# Patient Record
Sex: FEMALE | Race: WHITE | Employment: OTHER | URBAN - METROPOLITAN AREA
[De-identification: names, ages, dates, MRNs, and addresses within clinical notes are randomized per-mention and may not be internally consistent; named-entity substitution may affect disease eponyms.]

---

## 2017-04-26 ENCOUNTER — HOSPITAL ENCOUNTER (OUTPATIENT)
Dept: WOMENS IMAGING | Age: 72
Discharge: HOME | End: 2017-04-26
Attending: FAMILY MEDICINE | Admitting: FAMILY MEDICINE

## 2017-04-26 DIAGNOSIS — Z12.31 VISIT FOR SCREENING MAMMOGRAM: ICD-10-CM

## 2017-06-07 ENCOUNTER — HOSPITAL ENCOUNTER (OUTPATIENT)
Dept: OTHER | Age: 72
Discharge: HOME | End: 2017-06-30
Attending: PODIATRIST | Admitting: PODIATRIST

## 2017-06-09 ENCOUNTER — HOSPITAL ENCOUNTER (OUTPATIENT)
Dept: PHYSICAL THERAPY | Age: 72
Discharge: HOME | End: 2017-06-10
Admitting: PODIATRIST

## 2017-06-12 ENCOUNTER — HOSPITAL ENCOUNTER (OUTPATIENT)
Dept: PHYSICAL THERAPY | Age: 72
Discharge: HOME | End: 2017-06-13
Admitting: PODIATRIST

## 2017-06-14 ENCOUNTER — HOSPITAL ENCOUNTER (OUTPATIENT)
Dept: PHYSICAL THERAPY | Age: 72
Discharge: HOME | End: 2017-06-15
Admitting: PODIATRIST

## 2017-06-23 ENCOUNTER — HOSPITAL ENCOUNTER (OUTPATIENT)
Dept: PHYSICAL THERAPY | Age: 72
Discharge: HOME | End: 2017-06-24
Admitting: PODIATRIST

## 2017-06-30 ENCOUNTER — HOSPITAL ENCOUNTER (OUTPATIENT)
Dept: PHYSICAL THERAPY | Age: 72
Discharge: HOME | End: 2017-07-01
Admitting: PODIATRIST

## 2017-07-03 ENCOUNTER — HOSPITAL ENCOUNTER (OUTPATIENT)
Dept: PHYSICAL THERAPY | Age: 72
Discharge: HOME | End: 2017-07-04
Admitting: PODIATRIST

## 2018-05-15 ENCOUNTER — HOSPITAL ENCOUNTER (OUTPATIENT)
Dept: WOMENS IMAGING | Age: 73
Discharge: HOME | End: 2018-05-15
Attending: FAMILY MEDICINE | Admitting: FAMILY MEDICINE

## 2018-05-15 DIAGNOSIS — Z12.31 VISIT FOR SCREENING MAMMOGRAM: ICD-10-CM

## 2018-06-13 ENCOUNTER — OFFICE VISIT (OUTPATIENT)
Dept: ORTHOPEDIC SURGERY | Age: 73
End: 2018-06-13

## 2018-06-13 ENCOUNTER — PRE-EVALUATION (OUTPATIENT)
Dept: ORTHOPEDIC SURGERY | Age: 73
End: 2018-06-13

## 2018-06-13 VITALS
SYSTOLIC BLOOD PRESSURE: 139 MMHG | HEART RATE: 61 BPM | HEIGHT: 58 IN | WEIGHT: 195 LBS | DIASTOLIC BLOOD PRESSURE: 83 MMHG | BODY MASS INDEX: 40.93 KG/M2 | RESPIRATION RATE: 16 BRPM

## 2018-06-13 DIAGNOSIS — M25.531 RIGHT WRIST PAIN: ICD-10-CM

## 2018-06-13 DIAGNOSIS — S52.531A CLOSED COLLES' FRACTURE OF RIGHT RADIUS, INITIAL ENCOUNTER: ICD-10-CM

## 2018-06-13 DIAGNOSIS — M25.571 ACUTE RIGHT ANKLE PAIN: ICD-10-CM

## 2018-06-13 DIAGNOSIS — S82.64XA CLOSED NONDISPLACED FRACTURE OF LATERAL MALLEOLUS OF RIGHT FIBULA, INITIAL ENCOUNTER: ICD-10-CM

## 2018-06-13 DIAGNOSIS — S52.571A OTHER CLOSED INTRA-ARTICULAR FRACTURE OF DISTAL END OF RIGHT RADIUS, INITIAL ENCOUNTER: Primary | ICD-10-CM

## 2018-06-13 PROBLEM — S52.501A CLOSED FRACTURE OF RIGHT DISTAL RADIUS: Status: ACTIVE | Noted: 2018-06-13

## 2018-06-13 PROCEDURE — 99203 OFFICE O/P NEW LOW 30 MIN: CPT | Performed by: ORTHOPAEDIC SURGERY

## 2018-06-13 PROCEDURE — 1090F PRES/ABSN URINE INCON ASSESS: CPT | Performed by: ORTHOPAEDIC SURGERY

## 2018-06-13 PROCEDURE — APPSS30 APP SPLIT SHARED TIME 16-30 MINUTES: Performed by: NURSE PRACTITIONER

## 2018-06-13 PROCEDURE — 1123F ACP DISCUSS/DSCN MKR DOCD: CPT | Performed by: ORTHOPAEDIC SURGERY

## 2018-06-13 PROCEDURE — L3908 WHO COCK-UP NONMOLDE PRE OTS: HCPCS | Performed by: ORTHOPAEDIC SURGERY

## 2018-06-13 PROCEDURE — G8427 DOCREV CUR MEDS BY ELIG CLIN: HCPCS | Performed by: ORTHOPAEDIC SURGERY

## 2018-06-13 PROCEDURE — G8400 PT W/DXA NO RESULTS DOC: HCPCS | Performed by: ORTHOPAEDIC SURGERY

## 2018-06-13 PROCEDURE — G8417 CALC BMI ABV UP PARAM F/U: HCPCS | Performed by: ORTHOPAEDIC SURGERY

## 2018-06-13 PROCEDURE — G8599 NO ASA/ANTIPLAT THER USE RNG: HCPCS | Performed by: ORTHOPAEDIC SURGERY

## 2018-06-13 PROCEDURE — 4040F PNEUMOC VAC/ADMIN/RCVD: CPT | Performed by: ORTHOPAEDIC SURGERY

## 2018-06-13 PROCEDURE — 1036F TOBACCO NON-USER: CPT | Performed by: ORTHOPAEDIC SURGERY

## 2018-06-13 PROCEDURE — 27786 TREATMENT OF ANKLE FRACTURE: CPT | Performed by: ORTHOPAEDIC SURGERY

## 2018-06-13 PROCEDURE — L4361 PNEUMA/VAC WALK BOOT PRE OTS: HCPCS | Performed by: ORTHOPAEDIC SURGERY

## 2018-06-13 PROCEDURE — 3017F COLORECTAL CA SCREEN DOC REV: CPT | Performed by: ORTHOPAEDIC SURGERY

## 2018-06-13 RX ORDER — CLINDAMYCIN HYDROCHLORIDE 150 MG/1
300 CAPSULE ORAL 4 TIMES DAILY
Qty: 40 CAPSULE | Refills: 0 | Status: SHIPPED | OUTPATIENT
Start: 2018-06-13 | End: 2018-06-18

## 2018-06-13 RX ORDER — CEPHALEXIN 500 MG/1
500 CAPSULE ORAL 4 TIMES DAILY
Qty: 20 CAPSULE | Refills: 0 | Status: CANCELLED | OUTPATIENT
Start: 2018-06-13

## 2018-06-14 ENCOUNTER — HOSPITAL ENCOUNTER (OUTPATIENT)
Dept: SURGERY | Age: 73
Discharge: HOME | End: 2018-06-14
Attending: ORTHOPAEDIC SURGERY | Admitting: ORTHOPAEDIC SURGERY

## 2018-06-14 VITALS
HEART RATE: 84 BPM | HEIGHT: 58 IN | WEIGHT: 191 LBS | SYSTOLIC BLOOD PRESSURE: 144 MMHG | TEMPERATURE: 98 F | RESPIRATION RATE: 16 BRPM | BODY MASS INDEX: 40.09 KG/M2 | DIASTOLIC BLOOD PRESSURE: 75 MMHG | OXYGEN SATURATION: 96 %

## 2018-06-14 DIAGNOSIS — T14.8XXA FRACTURE: ICD-10-CM

## 2018-06-14 LAB
A/G RATIO: 1.1 (ref 1.1–2.2)
ALBUMIN SERPL-MCNC: 3.9 G/DL (ref 3.4–5)
ALP BLD-CCNC: 76 U/L (ref 40–129)
ALT SERPL-CCNC: 11 U/L (ref 10–40)
ANION GAP SERPL CALCULATED.3IONS-SCNC: 13 MMOL/L (ref 3–16)
AST SERPL-CCNC: 22 U/L (ref 15–37)
BILIRUB SERPL-MCNC: 0.6 MG/DL (ref 0–1)
BUN BLDV-MCNC: 13 MG/DL (ref 7–20)
CALCIUM SERPL-MCNC: 9 MG/DL (ref 8.3–10.6)
CHLORIDE BLD-SCNC: 97 MMOL/L (ref 99–110)
CO2: 28 MMOL/L (ref 21–32)
CREAT SERPL-MCNC: 0.6 MG/DL (ref 0.6–1.2)
EKG ATRIAL RATE: 63 BPM
EKG DIAGNOSIS: NORMAL
EKG P AXIS: 61 DEGREES
EKG P-R INTERVAL: 196 MS
EKG Q-T INTERVAL: 436 MS
EKG QRS DURATION: 84 MS
EKG QTC CALCULATION (BAZETT): 446 MS
EKG R AXIS: 5 DEGREES
EKG T AXIS: -14 DEGREES
EKG VENTRICULAR RATE: 63 BPM
GFR AFRICAN AMERICAN: >60
GFR NON-AFRICAN AMERICAN: >60
GLOBULIN: 3.4 G/DL
GLUCOSE BLD-MCNC: 100 MG/DL (ref 70–99)
POTASSIUM SERPL-SCNC: 3.8 MMOL/L (ref 3.5–5.1)
SODIUM BLD-SCNC: 138 MMOL/L (ref 136–145)
TOTAL PROTEIN: 7.3 G/DL (ref 6.4–8.2)

## 2018-06-14 PROCEDURE — 25608 OPTX DST RD XART FX/EPI SEP2: CPT | Performed by: ORTHOPAEDIC SURGERY

## 2018-06-14 PROCEDURE — 93010 ELECTROCARDIOGRAM REPORT: CPT | Performed by: INTERNAL MEDICINE

## 2018-06-14 PROCEDURE — 25608 OPTX DST RD XART FX/EPI SEP2: CPT | Performed by: NURSE PRACTITIONER

## 2018-06-14 RX ORDER — MIDAZOLAM HYDROCHLORIDE 1 MG/ML
2 INJECTION INTRAMUSCULAR; INTRAVENOUS ONCE
Status: COMPLETED | OUTPATIENT
Start: 2018-06-14 | End: 2018-06-14

## 2018-06-14 RX ORDER — SODIUM CHLORIDE, SODIUM LACTATE, POTASSIUM CHLORIDE, CALCIUM CHLORIDE 600; 310; 30; 20 MG/100ML; MG/100ML; MG/100ML; MG/100ML
INJECTION, SOLUTION INTRAVENOUS CONTINUOUS
Status: DISCONTINUED | OUTPATIENT
Start: 2018-06-14 | End: 2018-06-15 | Stop reason: HOSPADM

## 2018-06-14 RX ORDER — MEPERIDINE HYDROCHLORIDE 25 MG/ML
12.5 INJECTION INTRAMUSCULAR; INTRAVENOUS; SUBCUTANEOUS EVERY 5 MIN PRN
Status: DISCONTINUED | OUTPATIENT
Start: 2018-06-14 | End: 2018-06-14

## 2018-06-14 RX ORDER — OXYCODONE HYDROCHLORIDE AND ACETAMINOPHEN 5; 325 MG/1; MG/1
1 TABLET ORAL
Status: COMPLETED | OUTPATIENT
Start: 2018-06-14 | End: 2018-06-14

## 2018-06-14 RX ORDER — MELOXICAM 7.5 MG/1
7.5 TABLET ORAL DAILY
COMMUNITY
End: 2020-11-02

## 2018-06-14 RX ORDER — LIDOCAINE HYDROCHLORIDE 10 MG/ML
1 INJECTION, SOLUTION EPIDURAL; INFILTRATION; INTRACAUDAL; PERINEURAL
Status: ACTIVE | OUTPATIENT
Start: 2018-06-14 | End: 2018-06-14

## 2018-06-14 RX ORDER — HYDROMORPHONE HCL 110MG/55ML
0.5 PATIENT CONTROLLED ANALGESIA SYRINGE INTRAVENOUS EVERY 5 MIN PRN
Status: DISCONTINUED | OUTPATIENT
Start: 2018-06-14 | End: 2018-06-15 | Stop reason: HOSPADM

## 2018-06-14 RX ORDER — LABETALOL HYDROCHLORIDE 5 MG/ML
5 INJECTION, SOLUTION INTRAVENOUS EVERY 10 MIN PRN
Status: DISCONTINUED | OUTPATIENT
Start: 2018-06-14 | End: 2018-06-15 | Stop reason: HOSPADM

## 2018-06-14 RX ORDER — ONDANSETRON 2 MG/ML
4 INJECTION INTRAMUSCULAR; INTRAVENOUS
Status: DISPENSED | OUTPATIENT
Start: 2018-06-14 | End: 2018-06-14

## 2018-06-14 RX ORDER — M-VIT,TX,IRON,MINS/CALC/FOLIC 27MG-0.4MG
1 TABLET ORAL DAILY
COMMUNITY

## 2018-06-14 RX ORDER — CEFAZOLIN SODIUM 2 G/100ML
2 INJECTION, SOLUTION INTRAVENOUS
Status: COMPLETED | OUTPATIENT
Start: 2018-06-14 | End: 2018-06-14

## 2018-06-14 RX ORDER — SODIUM CHLORIDE 0.9 % (FLUSH) 0.9 %
10 SYRINGE (ML) INJECTION EVERY 12 HOURS SCHEDULED
Status: DISCONTINUED | OUTPATIENT
Start: 2018-06-14 | End: 2018-06-15 | Stop reason: HOSPADM

## 2018-06-14 RX ORDER — SODIUM CHLORIDE 0.9 % (FLUSH) 0.9 %
10 SYRINGE (ML) INJECTION PRN
Status: DISCONTINUED | OUTPATIENT
Start: 2018-06-14 | End: 2018-06-15 | Stop reason: HOSPADM

## 2018-06-14 RX ORDER — HYDRALAZINE HYDROCHLORIDE 20 MG/ML
5 INJECTION INTRAMUSCULAR; INTRAVENOUS EVERY 10 MIN PRN
Status: DISCONTINUED | OUTPATIENT
Start: 2018-06-14 | End: 2018-06-15 | Stop reason: HOSPADM

## 2018-06-14 RX ADMIN — MIDAZOLAM HYDROCHLORIDE 2 MG: 1 INJECTION INTRAMUSCULAR; INTRAVENOUS at 09:41

## 2018-06-14 RX ADMIN — CEFAZOLIN SODIUM 2 G: 2 INJECTION, SOLUTION INTRAVENOUS at 10:11

## 2018-06-14 RX ADMIN — SODIUM CHLORIDE, SODIUM LACTATE, POTASSIUM CHLORIDE, CALCIUM CHLORIDE: 600; 310; 30; 20 INJECTION, SOLUTION INTRAVENOUS at 09:05

## 2018-06-14 RX ADMIN — Medication 0.5 MG: at 11:07

## 2018-06-14 RX ADMIN — OXYCODONE HYDROCHLORIDE AND ACETAMINOPHEN 1 TABLET: 5; 325 TABLET ORAL at 11:29

## 2018-06-14 ASSESSMENT — PAIN DESCRIPTION - PAIN TYPE: TYPE: SURGICAL PAIN

## 2018-06-14 ASSESSMENT — PAIN SCALES - GENERAL
PAINLEVEL_OUTOF10: 7
PAINLEVEL_OUTOF10: 4
PAINLEVEL_OUTOF10: 10
PAINLEVEL_OUTOF10: 10
PAINLEVEL_OUTOF10: 4
PAINLEVEL_OUTOF10: 5
PAINLEVEL_OUTOF10: 10
PAINLEVEL_OUTOF10: 7
PAINLEVEL_OUTOF10: 8

## 2018-06-14 ASSESSMENT — PAIN DESCRIPTION - FREQUENCY: FREQUENCY: CONTINUOUS

## 2018-06-14 ASSESSMENT — PAIN - FUNCTIONAL ASSESSMENT: PAIN_FUNCTIONAL_ASSESSMENT: 0-10

## 2018-06-14 ASSESSMENT — PAIN DESCRIPTION - LOCATION: LOCATION: WRIST

## 2018-06-14 ASSESSMENT — PAIN DESCRIPTION - DESCRIPTORS: DESCRIPTORS: ACHING;DISCOMFORT

## 2018-06-14 ASSESSMENT — PAIN DESCRIPTION - ORIENTATION: ORIENTATION: RIGHT

## 2018-06-15 ENCOUNTER — TELEPHONE (OUTPATIENT)
Dept: ORTHOPEDIC SURGERY | Age: 73
End: 2018-06-15

## 2018-06-18 ENCOUNTER — TELEPHONE (OUTPATIENT)
Dept: ORTHOPEDIC SURGERY | Age: 73
End: 2018-06-18

## 2018-06-28 ENCOUNTER — OFFICE VISIT (OUTPATIENT)
Dept: ORTHOPEDIC SURGERY | Age: 73
End: 2018-06-28

## 2018-06-28 VITALS — RESPIRATION RATE: 16 BRPM | BODY MASS INDEX: 40.3 KG/M2 | WEIGHT: 192 LBS | HEIGHT: 58 IN

## 2018-06-28 DIAGNOSIS — S52.571A OTHER CLOSED INTRA-ARTICULAR FRACTURE OF DISTAL END OF RIGHT RADIUS, INITIAL ENCOUNTER: Primary | ICD-10-CM

## 2018-06-28 PROCEDURE — APPNB15 APP NON BILLABLE TIME 0-15 MINS: Performed by: NURSE PRACTITIONER

## 2018-06-28 PROCEDURE — 99024 POSTOP FOLLOW-UP VISIT: CPT | Performed by: NURSE PRACTITIONER

## 2018-07-26 ENCOUNTER — TELEPHONE (OUTPATIENT)
Dept: ORTHOPEDIC SURGERY | Age: 73
End: 2018-07-26

## 2018-07-26 NOTE — TELEPHONE ENCOUNTER
Called lm/voicemail to call office back in regards to message below.     *I don't think the patient is in a boot, we are treating for a wrist fracture, need to verify info*

## 2018-07-26 NOTE — TELEPHONE ENCOUNTER
Pt is calling to see if she can drive to the store. She wanted to take the boot off. The store is about 15 min away she has no one else to take her.

## 2018-07-31 ENCOUNTER — TELEPHONE (OUTPATIENT)
Dept: ORTHOPEDIC SURGERY | Age: 73
End: 2018-07-31

## 2018-07-31 NOTE — TELEPHONE ENCOUNTER
Pt had wrist sx 6/14/18 and is wanting to know if she is allowed to get into therapy pool and do limited exercises   pls call pt thanks

## 2018-08-09 ENCOUNTER — OFFICE VISIT (OUTPATIENT)
Dept: ORTHOPEDIC SURGERY | Age: 73
End: 2018-08-09

## 2018-08-09 VITALS
HEART RATE: 57 BPM | HEIGHT: 58 IN | BODY MASS INDEX: 40.3 KG/M2 | RESPIRATION RATE: 16 BRPM | DIASTOLIC BLOOD PRESSURE: 97 MMHG | WEIGHT: 192 LBS | SYSTOLIC BLOOD PRESSURE: 137 MMHG

## 2018-08-09 DIAGNOSIS — S82.64XA CLOSED NONDISPLACED FRACTURE OF LATERAL MALLEOLUS OF RIGHT FIBULA, INITIAL ENCOUNTER: ICD-10-CM

## 2018-08-09 DIAGNOSIS — S52.571A OTHER CLOSED INTRA-ARTICULAR FRACTURE OF DISTAL END OF RIGHT RADIUS, INITIAL ENCOUNTER: Primary | ICD-10-CM

## 2018-08-09 PROCEDURE — 99024 POSTOP FOLLOW-UP VISIT: CPT | Performed by: NURSE PRACTITIONER

## 2018-08-09 PROCEDURE — APPNB30 APP NON BILLABLE TIME 0-30 MINS: Performed by: NURSE PRACTITIONER

## 2018-08-12 NOTE — PROGRESS NOTES
DIAGNOSIS:    1-Right distal radius 2 part intra-articular displaced fracture, status post ORIF. 2-Right ankle pain / lateral malleolus fracture, 6/7/2018.     DATE OF SURGERY:  6/14/2018. HISTORY OF PRESENT ILLNESS:  Ms. Alfred Rose 68 y.o.  female right handed returns today  for 8 weeks postoperative visit and for 8 week follow up of her right lateral malleolus fracture. The patient denies any significant pain in the right wrist or right ankle. Rates pain a 3/10 VAS mild, aching, intermittent and are improving. Aggravating factors movement and walking. Alleviating factors elevation, rest. No numbness or tingling sensation. No fever or Chills. She  is in a brace and a boot right leg. PHYSICAL EXAMINATION:  The incision is completely healed right wrist.  No signs of any erythema or drainage. She  has no pain with the active or passive range of motion of the right wrist, but decrease ROM. She  has intact sensation, distally, and she  is neurovascularly intact. She has no pain with ROM right ankle, mild swelling. She is mildly tender over the fracture site right ankle. She is neurovascularly intact right lower extremity. IMAGING:  Three views right wrist taken today in the office showed anatomic alignment of right distal radius, plate and screws in good position, no loosening. Three views of the right ankle taken today in the office were reviewed and showed the distal fibula fracture, healing well. IMPRESSION:  8 weeks out from   1-Right distal radius 2 part intra-articular displaced fracture ORIF   2-Right lateral malleolus fracture    PLAN:  For the wrist: I have told the patient to work on ROM, as well as strengthening exercises. She can discontinue the removable forearm brace. No heavy impact activities. The patient will come back for a follow up in 6 weeks. At that time, we will take 3 views of the right wrist. PT if needed then.     For the ankle: She will be WBAT in the boot, and start aggressive ROM and peroneal strengthening exercise. Off the boot in 1 weeks. No heavy impact activities. The patient will come back for a follow up in 6 weeks. At that time, we will take 3 views of the right ankle standing. As this patient has demonstrated risk factors for osteoporosis, such as age greater than [de-identified] years and evidence of a fracture, I have referred the patient back to the primary care physician for evaluation for osteoporosis, including consideration for DEXA scanning, if this is felt to be clinically indicated. The patient is advised to contact the primary care physician to follow-up for further evaluation.      Vandana Fink, APRN - CNP

## 2018-09-20 ENCOUNTER — OFFICE VISIT (OUTPATIENT)
Dept: ORTHOPEDIC SURGERY | Age: 73
End: 2018-09-20

## 2018-09-20 VITALS — RESPIRATION RATE: 16 BRPM | HEIGHT: 58 IN | BODY MASS INDEX: 40.3 KG/M2 | WEIGHT: 192 LBS

## 2018-09-20 DIAGNOSIS — M25.571 ACUTE RIGHT ANKLE PAIN: ICD-10-CM

## 2018-09-20 DIAGNOSIS — S52.571A OTHER CLOSED INTRA-ARTICULAR FRACTURE OF DISTAL END OF RIGHT RADIUS, INITIAL ENCOUNTER: Primary | ICD-10-CM

## 2018-09-20 PROCEDURE — 1123F ACP DISCUSS/DSCN MKR DOCD: CPT | Performed by: ORTHOPAEDIC SURGERY

## 2018-09-20 PROCEDURE — 3017F COLORECTAL CA SCREEN DOC REV: CPT | Performed by: ORTHOPAEDIC SURGERY

## 2018-09-20 PROCEDURE — 99213 OFFICE O/P EST LOW 20 MIN: CPT | Performed by: ORTHOPAEDIC SURGERY

## 2018-09-20 PROCEDURE — G8417 CALC BMI ABV UP PARAM F/U: HCPCS | Performed by: ORTHOPAEDIC SURGERY

## 2018-09-20 PROCEDURE — 1036F TOBACCO NON-USER: CPT | Performed by: ORTHOPAEDIC SURGERY

## 2018-09-20 PROCEDURE — 1101F PT FALLS ASSESS-DOCD LE1/YR: CPT | Performed by: ORTHOPAEDIC SURGERY

## 2018-09-20 PROCEDURE — G8427 DOCREV CUR MEDS BY ELIG CLIN: HCPCS | Performed by: ORTHOPAEDIC SURGERY

## 2018-09-20 PROCEDURE — G8400 PT W/DXA NO RESULTS DOC: HCPCS | Performed by: ORTHOPAEDIC SURGERY

## 2018-09-20 PROCEDURE — 4040F PNEUMOC VAC/ADMIN/RCVD: CPT | Performed by: ORTHOPAEDIC SURGERY

## 2018-09-20 PROCEDURE — G8599 NO ASA/ANTIPLAT THER USE RNG: HCPCS | Performed by: ORTHOPAEDIC SURGERY

## 2018-09-20 PROCEDURE — 1090F PRES/ABSN URINE INCON ASSESS: CPT | Performed by: ORTHOPAEDIC SURGERY

## 2018-09-20 NOTE — PROGRESS NOTES
DIAGNOSIS:    1-Right distal radius 2 part intra-articular displaced fracture, status post ORIF. 2-Right ankle pain / lateral malleolus fracture, 2018. DATE OF SURGERY:  2018. HISTORY OF PRESENT ILLNESS:  Maxim Hunter 68 y.o.  female right handed returns today  for 3 months postoperative visit and follow up of her right lateral malleolus fracture. The patient denies any significant pain in the right wrist or right ankle. Rates pain a 2/10 VAS mild, aching, intermittent and are improving. Aggravating factors movement and walking. Alleviating factors elevation, rest. No numbness or tingling sensation. No fever or Chills. She  is in a brace and a boot right leg. Past Medical History:   Diagnosis Date    Arthritis     Cancer ([facility] [address]     breast    Cataract     Hypoglycemia     Personal history of kidney stones     Tremor        Past Surgical History:   Procedure Laterality Date    APPENDECTOMY      BREAST SURGERY      lumpectomy right     SECTION      CHOLECYSTECTOMY      GASTRIC BYPASS SURGERY      JOINT REPLACEMENT      left knee    JOINT REPLACEMENT      right knee    KIDNEY STONE SURGERY      percutaneous    KNEE ARTHROPLASTY  [id number]    RIGHT TOTAL KNEE ARTHROPLASTY      LITHOTRIPSY      ROTATOR CUFF REPAIR  ,     bilateral    SHOULDER ARTHROPLASTY Right  15    With rotator cuff repair    TONSILLECTOMY      WRIST FRACTURE SURGERY Right 2018     RIGHT DISTAL RADIUS OPEN REDUCTION INTERNAL FIXATION -[place]       Social History     Social History    Marital status:      Spouse name: N/A    Number of children: N/A    Years of education: N/A     Occupational History    Not on file.      Social History Main Topics    Smoking status: Never Smoker    Smokeless tobacco: Never Used    Alcohol use No    Drug use: No    Sexual activity: Not on file     Other Topics Concern    Not on file     Social History Narrative  No narrative on file       No family history on file. Current Outpatient Prescriptions on File Prior to Visit   Medication Sig Dispense Refill    Multiple Vitamins-Minerals (THERAPEUTIC MULTIVITAMIN-MINERALS) tablet Take 1 tablet by mouth daily      meloxicam (MOBIC) 7.5 MG tablet Take 7.5 mg by mouth daily      oxyCODONE-acetaminophen (PERCOCET) 5-325 MG per tablet Take 1 tablet by mouth 2 times daily. [place] calcium citrate (CALCITRATE) 950 MG tablet Take 2 tablets by mouth 2 times daily      loratadine (CLARITIN) 10 MG tablet Take 10 mg by mouth daily      oxybutynin (DITROPAN) 5 MG tablet Take 5 mg by mouth 2 times daily      gabapentin (NEURONTIN) 300 MG capsule Take 300 mg by mouth every evening      sertraline (ZOLOFT) 50 MG tablet Take 25 mg by mouth daily       pramipexole (MIRAPEX) 0.25 MG tablet Take 0.25 mg by mouth nightly as needed       No current facility-administered medications on file prior to visit. Pertinent items are noted in HPI  Review of systems reviewed from Patient History Form dated on 6/13/2018 and available in the patient's chart under the Media tab. No change noted. PHYSICAL EXAMINATION:  Ms. Sophie Mcghee is a very pleasant 68 y.o.  female who presents today in no acute distress, awake, alert, and oriented. She is well dressed, nourished and  groomed. Patient with normal affect. Height is  4' 10\" (1.473 m), weight is 192 lb (87.1 kg), Body mass index is 40.13 kg/m². Resting respiratory rate is 16. The patient walks with no limp. The incision is completely healed right wrist.  No signs of any erythema or drainage. She  has no pain with the active or passive range of motion of the right wrist, full ROM. She  has intact sensation, distally, and she  is neurovascularly intact. She has no pain with ROM right ankle, mild swelling. She is not tender over the fracture site right ankle. She is neurovascularly intact right lower extremity.  Good strength,

## 2019-06-21 ENCOUNTER — HOSPITAL ENCOUNTER (OUTPATIENT)
Dept: WOMENS IMAGING | Age: 74
Discharge: HOME | End: 2019-06-21
Payer: MEDICARE

## 2019-06-21 DIAGNOSIS — Z12.39 BREAST CANCER SCREENING: ICD-10-CM

## 2019-06-21 PROCEDURE — 77063 BREAST TOMOSYNTHESIS BI: CPT

## 2020-01-25 ENCOUNTER — APPOINTMENT (OUTPATIENT)
Dept: GENERAL RADIOLOGY | Age: 75
End: 2020-01-25
Payer: MEDICARE

## 2020-01-25 ENCOUNTER — HOSPITAL ENCOUNTER (EMERGENCY)
Age: 75
Discharge: HOME | End: 2020-01-25
Attending: EMERGENCY MEDICINE
Payer: MEDICARE

## 2020-01-25 VITALS
WEIGHT: 181.44 LBS | TEMPERATURE: 97.1 F | DIASTOLIC BLOOD PRESSURE: 93 MMHG | RESPIRATION RATE: 16 BRPM | OXYGEN SATURATION: 97 % | HEART RATE: 67 BPM | HEIGHT: 59 IN | SYSTOLIC BLOOD PRESSURE: 187 MMHG | BODY MASS INDEX: 36.58 KG/M2

## 2020-01-25 LAB
ANION GAP SERPL CALCULATED.3IONS-SCNC: 16 MMOL/L (ref 3–16)
BASOPHILS ABSOLUTE: 0.1 K/UL (ref 0–0.2)
BASOPHILS RELATIVE PERCENT: 0.8 %
BUN BLDV-MCNC: 9 MG/DL (ref 7–20)
CALCIUM SERPL-MCNC: 8.9 MG/DL (ref 8.3–10.6)
CHLORIDE BLD-SCNC: 99 MMOL/L (ref 99–110)
CO2: 24 MMOL/L (ref 21–32)
CREAT SERPL-MCNC: 0.5 MG/DL (ref 0.6–1.2)
EOSINOPHILS ABSOLUTE: 0 K/UL (ref 0–0.6)
EOSINOPHILS RELATIVE PERCENT: 0.3 %
GFR AFRICAN AMERICAN: >60
GFR NON-AFRICAN AMERICAN: >60
GLUCOSE BLD-MCNC: 91 MG/DL (ref 70–99)
HCT VFR BLD CALC: 41 % (ref 36–48)
HEMOGLOBIN: 13.1 G/DL (ref 12–16)
INR BLD: 0.98 (ref 0.86–1.14)
LYMPHOCYTES ABSOLUTE: 1.7 K/UL (ref 1–5.1)
LYMPHOCYTES RELATIVE PERCENT: 15.9 %
MCH RBC QN AUTO: 29.4 PG (ref 26–34)
MCHC RBC AUTO-ENTMCNC: 31.9 G/DL (ref 31–36)
MCV RBC AUTO: 92.2 FL (ref 80–100)
MONOCYTES ABSOLUTE: 0.9 K/UL (ref 0–1.3)
MONOCYTES RELATIVE PERCENT: 7.7 %
NEUTROPHILS ABSOLUTE: 8.3 K/UL (ref 1.7–7.7)
NEUTROPHILS RELATIVE PERCENT: 75.3 %
PDW BLD-RTO: 14.8 % (ref 12.4–15.4)
PLATELET # BLD: 290 K/UL (ref 135–450)
PMV BLD AUTO: 7.9 FL (ref 5–10.5)
POTASSIUM REFLEX MAGNESIUM: 3.6 MMOL/L (ref 3.5–5.1)
PROTHROMBIN TIME: 11.4 SEC (ref 10–13.2)
RBC # BLD: 4.45 M/UL (ref 4–5.2)
SODIUM BLD-SCNC: 139 MMOL/L (ref 136–145)
WBC # BLD: 11 K/UL (ref 4–11)

## 2020-01-25 PROCEDURE — 85610 PROTHROMBIN TIME: CPT

## 2020-01-25 PROCEDURE — 80048 BASIC METABOLIC PNL TOTAL CA: CPT

## 2020-01-25 PROCEDURE — 72100 X-RAY EXAM L-S SPINE 2/3 VWS: CPT

## 2020-01-25 PROCEDURE — 72070 X-RAY EXAM THORAC SPINE 2VWS: CPT

## 2020-01-25 PROCEDURE — 73030 X-RAY EXAM OF SHOULDER: CPT

## 2020-01-25 PROCEDURE — 85025 COMPLETE CBC W/AUTO DIFF WBC: CPT

## 2020-01-25 PROCEDURE — 36415 COLL VENOUS BLD VENIPUNCTURE: CPT

## 2020-01-25 PROCEDURE — 93005 ELECTROCARDIOGRAM TRACING: CPT | Performed by: EMERGENCY MEDICINE

## 2020-01-25 PROCEDURE — 99284 EMERGENCY DEPT VISIT MOD MDM: CPT

## 2020-01-25 RX ORDER — METAXALONE 800 MG/1
800 TABLET ORAL 3 TIMES DAILY
Qty: 30 TABLET | Refills: 0 | Status: SHIPPED | OUTPATIENT
Start: 2020-01-25 | End: 2020-02-04

## 2020-01-25 RX ORDER — LIDOCAINE 50 MG/G
1 PATCH TOPICAL DAILY
Qty: 10 PATCH | Refills: 0 | Status: SHIPPED | OUTPATIENT
Start: 2020-01-25 | End: 2020-02-04

## 2020-01-25 RX ORDER — MORPHINE SULFATE 2 MG/ML
2 INJECTION, SOLUTION INTRAMUSCULAR; INTRAVENOUS ONCE
Status: DISCONTINUED | OUTPATIENT
Start: 2020-01-25 | End: 2020-01-25 | Stop reason: HOSPADM

## 2020-01-25 ASSESSMENT — PAIN DESCRIPTION - DESCRIPTORS: DESCRIPTORS: TENDER;SHARP

## 2020-01-25 ASSESSMENT — ENCOUNTER SYMPTOMS
BACK PAIN: 1
SHORTNESS OF BREATH: 0
ABDOMINAL PAIN: 0

## 2020-01-25 ASSESSMENT — PAIN DESCRIPTION - PROGRESSION: CLINICAL_PROGRESSION: GRADUALLY WORSENING

## 2020-01-25 ASSESSMENT — PAIN SCALES - GENERAL
PAINLEVEL_OUTOF10: 9
PAINLEVEL_OUTOF10: 5

## 2020-01-25 ASSESSMENT — PAIN DESCRIPTION - PAIN TYPE: TYPE: ACUTE PAIN

## 2020-01-25 ASSESSMENT — PAIN DESCRIPTION - ORIENTATION: ORIENTATION: LEFT

## 2020-01-25 ASSESSMENT — PAIN - FUNCTIONAL ASSESSMENT: PAIN_FUNCTIONAL_ASSESSMENT: PREVENTS OR INTERFERES WITH MANY ACTIVE NOT PASSIVE ACTIVITIES

## 2020-01-25 ASSESSMENT — PAIN DESCRIPTION - FREQUENCY: FREQUENCY: CONTINUOUS

## 2020-01-25 ASSESSMENT — PAIN DESCRIPTION - LOCATION: LOCATION: ARM;BACK;SHOULDER

## 2020-01-25 NOTE — ED PROVIDER NOTES
[facility]      Pt Name: Antionette Rincon  MRN: [id number]  [place] 1945  Date of evaluation: 1/25/2020  Provider: Cassandra Rodríguez MD    [address] [place] [address]       Chief Complaint   Patient presents with    Fall     patient states slip and fall today outside on the deck @ 11:30am.  c/o worsening back, left arm and left shoulder pain. to ED for eval    Back Pain         HISTORY OF PRESENT ILLNESS   (Location/Symptom, Timing/Onset, Context/Setting, Quality, Duration, Modifying Factors, Severity)  Note limiting factors. Antionette Rincon is a 76 y.o. female who presents to the emergency department with back and left shoulder pain     HPI    This is a pleasant 27-year-old  female who suffered a mechanical fall on her wet deck in her backyard earlier today. She landed on her back and then injured her shoulder attempting to catch her self. She denies any syncope chest pain or fainting. She did not strike her head and she is not on any blood thinners. She complains of pain predominantly left shoulder which describes dull achy worse with movements and relieved by rest with no other aggravating leaving factors. She also complains of lumbar back pain which is dull achy worse with movements relieved by rest nonradiating with no other aggravating leaving factors. She denies any focal numbness weakness or tingling and she is able to ambulate with no difficulty. Nursing Notes were reviewed. REVIEW OF SYSTEMS    (2-9 systems for level 4, 10 or more for level 5)     Review of Systems   Constitutional: Negative for chills and fever. Respiratory: Negative for shortness of breath. Gastrointestinal: Negative for abdominal pain. Musculoskeletal: Positive for back pain. Neurological: Negative for headaches. All other systems reviewed and are negative.       Except as noted above the remainder of the review of systems was reviewed and negative. PAST MEDICAL HISTORY     Past Medical History:   Diagnosis Date    Arthritis     Cancer [facility])     breast    Cataract     Hypoglycemia     Personal history of kidney stones     Tremor          SURGICAL HISTORY       Past Surgical History:   Procedure Laterality Date    APPENDECTOMY      BREAST SURGERY  2004    lumpectomy right     SECTION  1975    CHOLECYSTECTOMY      GASTRIC BYPASS SURGERY  2000    JOINT REPLACEMENT  2008    left knee    JOINT REPLACEMENT  2011    right knee    KIDNEY STONE SURGERY      percutaneous    KNEE ARTHROPLASTY  [id number]    RIGHT TOTAL KNEE ARTHROPLASTY      LITHOTRIPSY      ROTATOR CUFF REPAIR  ,     bilateral    SHOULDER ARTHROPLASTY Right 7 28 15    With rotator cuff repair    TONSILLECTOMY      WRIST FRACTURE SURGERY Right 2018     RIGHT DISTAL RADIUS OPEN REDUCTION INTERNAL FIXATION -[employer]         CURRENT MEDICATIONS       Previous Medications    CALCIUM CITRATE (CALCITRATE) 950 MG TABLET    Take 2 tablets by mouth 2 times daily    GABAPENTIN (NEURONTIN) 300 MG CAPSULE    Take 300 mg by mouth every evening    LORATADINE (CLARITIN) 10 MG TABLET    Take 10 mg by mouth daily    MELOXICAM (MOBIC) 7.5 MG TABLET    Take 7.5 mg by mouth daily    MULTIPLE VITAMINS-MINERALS (THERAPEUTIC MULTIVITAMIN-MINERALS) TABLET    Take 1 tablet by mouth daily    OXYBUTYNIN (DITROPAN) 5 MG TABLET    Take 5 mg by mouth 2 times daily    OXYCODONE-ACETAMINOPHEN (PERCOCET) 5-325 MG PER TABLET    Take 1 tablet by mouth 2 times daily. [place] PRAMIPEXOLE (MIRAPEX) 0.25 MG TABLET    Take 0.25 mg by mouth nightly as needed    SERTRALINE (ZOLOFT) 50 MG TABLET    Take 25 mg by mouth daily        ALLERGIES     Atenolol; Augmentin [amoxicillin-pot clavulanate]; and Demerol    FAMILY HISTORY     No family history on file.        SOCIAL HISTORY       Social History     Socioeconomic History    Marital status:      Spouse name: Not on file    Number of abnormality of the thoracic spine. Osteopenia with   multilevel thoracic spondylosis. New superior endplate L1 fracture since chest x-ray 12/23/2017. If further   evaluation for the acuity of the injury is indicated, consider MRI. XR SHOULDER LEFT (MIN 2 VIEWS)   Final Result   No acute abnormality identified. No fracture or dislocation. Severe   osteoporosis. ED BEDSIDE ULTRASOUND:   Performed by ED Physician - none    LABS:  Labs Reviewed   CBC WITH AUTO DIFFERENTIAL - Abnormal; Notable for the following components:       Result Value    Neutrophils Absolute 8.3 (*)     All other components within normal limits    Narrative:     Performed at:  [place] [address]   Phone [phone]   BASIC METABOLIC PANEL W/ REFLEX TO MG FOR LOW K - Abnormal; Notable for the following components:    CREATININE 0.5 (*)     All other components within normal limits    Narrative:     Performed at:  [place] [address]   Phone [phone]   PROTIME-INR    Narrative:     Performed at:  [facility]  [address] [place] [address]   Phone [phone]   URINALYSIS       All other labs were within normal range or not returned as of this dictation. EMERGENCY DEPARTMENT COURSE and DIFFERENTIAL DIAGNOSIS/MDM:   Vitals:    Vitals:    01/25/20 1604   BP: (!) 187/93   Pulse: 67   Resp: 16   Temp: 97.1 °F (36.2 °C)   TempSrc: Oral   SpO2: 97%   Weight: 181 lb 7 oz (82.3 kg)   Height: 4' 11\" (1.499 m)       Of history and physical exam were performed. I reviewed the past medical past surgical social and family history. She was given appropriate analgesia emergency department. MDM    I consider the diagnosis of thoracic or lumbar fracture and she has an L1 compression fracture.   Neuro vastly intact lives by herself at home and I am concerned with her able to handle her ADLs. She needs to be admitted for pain control and for spine consultation at this time. I also consulted [facility] and Dr. Angelina Ventura returned my call. After appropriate analgesia she actually is much better at this time. Is ambulating without significant difficulty she has sterile ice at home and her daughter actually lives with her which is originally not clear to me. Not wish to stay and I think she is stable and safe at home at this point in time      REASSESSMENT          CRITICAL CARE TIME     CONSULTS:  [place]:  Unless otherwise noted below, none     Procedures        FINAL IMPRESSION      1. Closed fracture of first lumbar vertebra, unspecified fracture morphology, initial encounter [facility])          DISPOSITION/PLAN   DISPOSITION      Decision to Discharge    PATIENT REFERRED TO:  [address] [place] [id number] [phone]    In 3 days      Jesica Ridley [facility]  [phone]      As needed      DISCHARGE MEDICATIONS:  New Prescriptions    LIDOCAINE (LIDODERM) 5 %    Place 1 patch onto the skin daily for 10 days 12 hours on, 12 hours off. METAXALONE (SKELAXIN) 800 MG TABLET    Take 1 tablet by mouth 3 times daily for 10 days     Controlled Substances Monitoring:     No flowsheet data found.     (Please note that portions of this note were completed with a voice recognition program.  Efforts were made to edit the dictations but occasionally words are mis-transcribed.)    Miri Li MD (electronically signed)  Attending Emergency Physician         Miri Li MD  01/25/20 174       Miri Li MD  01/25/20 1926       Miri Li MD  01/25/20 2578

## 2020-01-25 NOTE — ED TRIAGE NOTES
patient states slip and fall today outside on the deck @ 11:30am.  c/o worsening back, left arm and left shoulder pain.   to ED for eval

## 2020-01-26 LAB
EKG ATRIAL RATE: 55 BPM
EKG DIAGNOSIS: NORMAL
EKG P AXIS: 20 DEGREES
EKG P-R INTERVAL: 174 MS
EKG Q-T INTERVAL: 442 MS
EKG QRS DURATION: 98 MS
EKG QTC CALCULATION (BAZETT): 422 MS
EKG R AXIS: 13 DEGREES
EKG T AXIS: 6 DEGREES
EKG VENTRICULAR RATE: 55 BPM

## 2020-01-26 PROCEDURE — 93010 ELECTROCARDIOGRAM REPORT: CPT | Performed by: INTERNAL MEDICINE

## 2020-01-26 NOTE — H&P
Hospital Medicine History & Physical      PCP: Dania Garcia    Date of Admission: 2020    Date of Service: Pt seen/examined on 2020 and discharged home    Chief Complaint: I fell      History Of Present Illness:    76 y.o. female with past medical history as below who was at home outside walking when she slipped and fell. She did reach out and try and grab the table. She had pain in her shoulder. She was able to get up on her own. Work-up in the emergency department included CT of the spine that demonstrated L1 compression fracture that was minor. There was concerns for inadequate pain control. Past Medical History:        Diagnosis Date    Arthritis     Cancer ([address]     breast    Cataract     Hypoglycemia     Personal history of kidney stones     Tremor        Past Surgical History:        Procedure Laterality Date    APPENDECTOMY      BREAST SURGERY      lumpectomy right     SECTION      CHOLECYSTECTOMY      GASTRIC BYPASS SURGERY      JOINT REPLACEMENT      left knee    JOINT REPLACEMENT      right knee    KIDNEY STONE SURGERY      percutaneous    KNEE ARTHROPLASTY  [id number]    RIGHT TOTAL KNEE ARTHROPLASTY      LITHOTRIPSY      ROTATOR CUFF REPAIR  ,     bilateral    SHOULDER ARTHROPLASTY Right  15    With rotator cuff repair    TONSILLECTOMY      WRIST FRACTURE SURGERY Right 2018     RIGHT DISTAL RADIUS OPEN REDUCTION INTERNAL FIXATION -[facility]       Medications Prior to Admission:   Prior to Admission medications    Medication Sig Start Date End Date Taking? Authorizing Provider   Multiple Vitamins-Minerals (THERAPEUTIC MULTIVITAMIN-MINERALS) tablet Take 1 tablet by mouth daily    Historical Provider, MD   meloxicam (MOBIC) 7.5 MG tablet Take 7.5 mg by mouth daily    Historical Provider, MD   oxyCODONE-acetaminophen (PERCOCET) 5-325 MG per tablet Take 1 tablet by mouth 2 times daily. Conner Alves     Historical Provider, MD   calcium citrate (CALCITRATE) 950 MG tablet Take 2 tablets by mouth 2 times daily    Historical Provider, MD   loratadine (CLARITIN) 10 MG tablet Take 10 mg by mouth daily    Historical Provider, MD   oxybutynin (DITROPAN) 5 MG tablet Take 5 mg by mouth 2 times daily    Historical Provider, MD   gabapentin (NEURONTIN) 300 MG capsule Take 300 mg by mouth every evening    Historical Provider, MD   sertraline (ZOLOFT) 50 MG tablet Take 25 mg by mouth daily     Historical Provider, MD   pramipexole (MIRAPEX) 0.25 MG tablet Take 0.25 mg by mouth nightly as needed    Historical Provider, MD       Allergies:  Atenolol; Augmentin [amoxicillin-pot clavulanate]; and Demerol    Social History:      The patient currently lives her daughter    TOBACCO:   reports that she has never smoked. She has never used smokeless tobacco.  ETOH:   reports no history of alcohol use. DRUGS: denies      Family History:      Positive as follows:    No family history on file. REVIEW OF SYSTEMS:   Pertinent positives as noted in the HPI. All other systems reviewed and negative. PHYSICAL EXAM PERFORMED:    BP (!) 187/93   Pulse 67   Temp 97.1 °F (36.2 °C) (Oral)   Resp 16   Ht 4' 11\" (1.499 m)   Wt 181 lb 7 oz (82.3 kg)   SpO2 97%   BMI 36.65 kg/m²     General appearance:  No apparent distress, appears stated age and cooperative. HEENT:  Normal cephalic, atraumatic without obvious deformity. Pupils equal, round, and reactive to light. Extra ocular muscles intact. Conjunctivae/corneas clear. Neck: Supple, with full range of motion. No jugular venous distention. Trachea midline. Respiratory:  Normal respiratory effort. Clear to auscultation, bilaterally without Rales/Wheezes/Rhonchi. Cardiovascular:  Regular rate and rhythm with normal S1/S2 without murmurs, rubs or gallops. Abdomen: Soft, non-tender, non-distended with normal bowel sounds. Musculoskeletal:  No clubbing, cyanosis or edema bilaterally.   Decreased range of motion at left shoulder. She is tender to palpation. Skin: Skin color, texture, turgor normal.  No rashes or lesions. Neurologic:  Neurovascularly intact without any focal sensory/motor deficits. Cranial nerves: II-XII intact, grossly non-focal.  Psychiatric:  Alert and oriented, thought content appropriate, normal insight  Capillary Refill: Brisk,< 3 seconds   Peripheral Pulses: +2 palpable, equal bilaterally       Labs:     Recent Labs     01/25/20  1830   WBC 11.0   HGB 13.1   HCT 41.0        Recent Labs     01/25/20  1830      K 3.6   CL 99   CO2 24   BUN 9   CREATININE 0.5*   CALCIUM 8.9     No results for input(s): AST, ALT, BILIDIR, BILITOT, ALKPHOS in the last 72 hours. Recent Labs     01/25/20  1830   INR 0.98     No results for input(s): [place] Citrin in the last 72 hours. Urinalysis:      Lab Results   Component Value Date    NITRU Negative 07/09/2015    WBCUA 0-2 07/09/2015    RBCUA 0-2 07/09/2015    BLOODU Negative 07/09/2015    SPECGRAV <=1.005 07/09/2015    GLUCOSEU Negative 07/09/2015       EKG:    I have reviewed the EKG with the following interpretation: Sinus rhythm    Radiology:     XR THORACIC SPINE (2 VIEWS)   Final Result   No acute osseous abnormality of the thoracic spine. Osteopenia with   multilevel thoracic spondylosis. New superior endplate L1 fracture since chest x-ray 12/23/2017. If further   evaluation for the acuity of the injury is indicated, consider MRI. XR LUMBAR SPINE (2-3 VIEWS)   Final Result   No acute osseous abnormality of the thoracic spine. Osteopenia with   multilevel thoracic spondylosis. New superior endplate L1 fracture since chest x-ray 12/23/2017. If further   evaluation for the acuity of the injury is indicated, consider MRI. XR SHOULDER LEFT (MIN 2 VIEWS)   Final Result   No acute abnormality identified. No fracture or dislocation. Severe   osteoporosis.              ASSESSMENT/PLAN:  Compression fracture of L1  Patient

## 2020-08-03 ENCOUNTER — HOSPITAL ENCOUNTER (OUTPATIENT)
Dept: WOMENS IMAGING | Age: 75
Discharge: HOME | End: 2020-08-03
Payer: MEDICARE

## 2020-08-03 PROCEDURE — 77067 SCR MAMMO BI INCL CAD: CPT

## 2020-11-02 RX ORDER — LANOLIN ALCOHOL/MO/W.PET/CERES
1000 CREAM (GRAM) TOPICAL DAILY
COMMUNITY
Start: 2018-08-28

## 2020-11-02 NOTE — PROGRESS NOTES
[address] time____11/9/2020 1000________        Surgery time____1100________    Take the following medications with a sip of water:    Do not eat or drink anything after 12:00 midnight prior to your surgery. This includes water chewing gum, mints and ice chips. You may brush your teeth and gargle the morning of your surgery, but do not swallow the water     Please see your family doctor/pediatrician for a history and physical and/or concerning medications. Bring any test results/reports from your physicians office. If you are under the care of a heart doctor or specialist doctor, please be aware that you may be asked to them for clearance    You may be asked to stop blood thinners such as Coumadin, Plavix, Fragmin, Lovenox, etc., or any anti-inflammatories such as:  Aspirin, Ibuprofen, Advil, Naproxen prior to your surgery. We also ask that you stop any OTC medications such as fish oil, vitamin E, glucosamine, garlic, Multivitamins, COQ 10, etc.    We ask that you do not smoke 24 hours prior to surgery  We ask that you do not  drink any alcoholic beverages 24 hours prior to surgery     You must make arrangements for a responsible adult to take you home after your surgery. For your safety you will not be allowed to leave alone or drive yourself home. Your surgery will be cancelled if you do not have a ride home. Also for your safety, it is strongly suggested that someone stay with you the first 24 hours after your surgery. A parent or legal guardian must accompany a child scheduled for surgery and plan to stay at the hospital until the child is discharged. Please do not bring other children with you. For your comfort, please wear simple loose fitting clothing to the hospital.  Please do not bring valuables.     Do not wear any make-up or nail polish on your fingers or toes      For your safety, please do not wear any jewelry or body piercing's on the day of surgery. All jewelry must be removed. If you have dentures, they will be removed before going to operating room. For your convenience, we will provide you with a container. If you wear contact lenses or glasses, they will be removed, please bring a case for them. If you have a living will and a durable power of  for healthcare, please bring in a copy. As part of our patient safety program to minimize surgical site infections, we ask you to do the following:    · Please notify your surgeon if you develop any illness between         now and the  day of your surgery. · This includes a cough, cold, fever, sore throat, nausea,         or vomiting, and diarrhea, etc.  ·  Please notify your surgeon if you experience dizziness, shortness         of breath or blurred vision between now and the time of your surgery. Do not shave your operative site 96 hours prior to surgery. For face and neck surgery, men may use an electric razor 48 hours   prior to surgery. You may shower the night before surgery or the morning of   your surgery with an antibacterial soap. You will need to bring a photo ID and insurance card    [facility] has an onsite pharmacy, would you like to utilize our pharmacy     If you will be staying overnight and use a C-pap machine, please bring   your C-pap to hospital     Our goal is to provide you with excellent care, therefore, visitors will be limited to two(2) in the room at a time so that we may focus on providing this care for you. Please contact pre-admission testing if you have any further questions. [facility] phone number:  [phone] [facility] fax number:  [phone]  Please note these are generalized instructions for all surgical cases, you may be provided with more specific instructions according to your surgery.

## 2020-11-06 ENCOUNTER — ANESTHESIA EVENT (OUTPATIENT)
Dept: ENDOSCOPY | Age: 75
End: 2020-11-06
Payer: MEDICARE

## 2020-11-09 ENCOUNTER — HOSPITAL ENCOUNTER (OUTPATIENT)
Age: 75
Setting detail: OUTPATIENT SURGERY
Discharge: HOME | End: 2020-11-09
Attending: INTERNAL MEDICINE | Admitting: INTERNAL MEDICINE
Payer: MEDICARE

## 2020-11-09 ENCOUNTER — ANESTHESIA (OUTPATIENT)
Dept: ENDOSCOPY | Age: 75
End: 2020-11-09
Payer: MEDICARE

## 2020-11-09 VITALS
HEART RATE: 57 BPM | TEMPERATURE: 97.1 F | RESPIRATION RATE: 18 BRPM | HEIGHT: 59 IN | BODY MASS INDEX: 33.09 KG/M2 | WEIGHT: 164.13 LBS | OXYGEN SATURATION: 99 % | DIASTOLIC BLOOD PRESSURE: 82 MMHG | SYSTOLIC BLOOD PRESSURE: 151 MMHG

## 2020-11-09 VITALS
SYSTOLIC BLOOD PRESSURE: 153 MMHG | RESPIRATION RATE: 18 BRPM | DIASTOLIC BLOOD PRESSURE: 68 MMHG | OXYGEN SATURATION: 100 %

## 2020-11-09 PROCEDURE — 7100000011 HC PHASE II RECOVERY - ADDTL 15 MIN: Performed by: INTERNAL MEDICINE

## 2020-11-09 PROCEDURE — 2580000003 HC RX 258: Performed by: ANESTHESIOLOGY

## 2020-11-09 PROCEDURE — 2500000003 HC RX 250 WO HCPCS: Performed by: NURSE ANESTHETIST, CERTIFIED REGISTERED

## 2020-11-09 PROCEDURE — 6360000002 HC RX W HCPCS: Performed by: NURSE ANESTHETIST, CERTIFIED REGISTERED

## 2020-11-09 PROCEDURE — 3609027000 HC COLONOSCOPY: Performed by: INTERNAL MEDICINE

## 2020-11-09 PROCEDURE — 3700000001 HC ADD 15 MINUTES (ANESTHESIA): Performed by: INTERNAL MEDICINE

## 2020-11-09 PROCEDURE — 7100000010 HC PHASE II RECOVERY - FIRST 15 MIN: Performed by: INTERNAL MEDICINE

## 2020-11-09 PROCEDURE — 3700000000 HC ANESTHESIA ATTENDED CARE: Performed by: INTERNAL MEDICINE

## 2020-11-09 RX ORDER — LIDOCAINE HYDROCHLORIDE 20 MG/ML
INJECTION, SOLUTION EPIDURAL; INFILTRATION; INTRACAUDAL; PERINEURAL PRN
Status: DISCONTINUED | OUTPATIENT
Start: 2020-11-09 | End: 2020-11-09 | Stop reason: SDUPTHER

## 2020-11-09 RX ORDER — SODIUM CHLORIDE 0.9 % (FLUSH) 0.9 %
10 SYRINGE (ML) INJECTION EVERY 12 HOURS SCHEDULED
Status: DISCONTINUED | OUTPATIENT
Start: 2020-11-09 | End: 2020-11-09 | Stop reason: HOSPADM

## 2020-11-09 RX ORDER — PROPOFOL 10 MG/ML
INJECTION, EMULSION INTRAVENOUS PRN
Status: DISCONTINUED | OUTPATIENT
Start: 2020-11-09 | End: 2020-11-09 | Stop reason: SDUPTHER

## 2020-11-09 RX ORDER — SODIUM CHLORIDE 9 MG/ML
INJECTION, SOLUTION INTRAVENOUS CONTINUOUS
Status: DISCONTINUED | OUTPATIENT
Start: 2020-11-09 | End: 2020-11-09 | Stop reason: HOSPADM

## 2020-11-09 RX ORDER — SODIUM CHLORIDE 0.9 % (FLUSH) 0.9 %
10 SYRINGE (ML) INJECTION PRN
Status: DISCONTINUED | OUTPATIENT
Start: 2020-11-09 | End: 2020-11-09 | Stop reason: HOSPADM

## 2020-11-09 RX ADMIN — LIDOCAINE HYDROCHLORIDE 60 MG: 20 INJECTION, SOLUTION EPIDURAL; INFILTRATION; INTRACAUDAL; PERINEURAL at 11:29

## 2020-11-09 RX ADMIN — PROPOFOL 40 MG: 10 INJECTION, EMULSION INTRAVENOUS at 11:44

## 2020-11-09 RX ADMIN — PROPOFOL 60 MG: 10 INJECTION, EMULSION INTRAVENOUS at 11:29

## 2020-11-09 RX ADMIN — PROPOFOL 40 MG: 10 INJECTION, EMULSION INTRAVENOUS at 11:36

## 2020-11-09 RX ADMIN — SODIUM CHLORIDE: 9 INJECTION, SOLUTION INTRAVENOUS at 10:50

## 2020-11-09 ASSESSMENT — LIFESTYLE VARIABLES: SMOKING_STATUS: 0

## 2020-11-09 ASSESSMENT — PAIN - FUNCTIONAL ASSESSMENT: PAIN_FUNCTIONAL_ASSESSMENT: 0-10

## 2020-11-09 ASSESSMENT — PAIN SCALES - GENERAL
PAINLEVEL_OUTOF10: 0

## 2020-11-09 NOTE — H&P
[facility]   Pre-operative History and Physical    Patient: Eva Dudley  : 1945  Acct#: [de-identified]    History Obtained From: electronic medical record    HISTORY OF PRESENT ILLNESS  Procedure:Colonoscopy  Indications:screening  Past Medical History:        Diagnosis Date    Arthritis     Cancer ([address]     breast    Cataract     Hypoglycemia     Personal history of kidney stones     Tremor      Past Surgical History:        Procedure Laterality Date    APPENDECTOMY      BREAST SURGERY  2004    lumpectomy right     SECTION      CHOLECYSTECTOMY      COLONOSCOPY      GASTRIC BYPASS SURGERY  2000    JOINT REPLACEMENT      left knee    JOINT REPLACEMENT      right knee    KIDNEY STONE SURGERY      percutaneous    KNEE ARTHROPLASTY  [id number]    RIGHT TOTAL KNEE ARTHROPLASTY      LITHOTRIPSY      ROTATOR CUFF REPAIR  ,     bilateral    SHOULDER ARTHROPLASTY Right 7 28 15    With rotator cuff repair    TONSILLECTOMY      WRIST FRACTURE SURGERY Right 2018     RIGHT DISTAL RADIUS OPEN REDUCTION INTERNAL FIXATION -[employer]     Medications prior to admission:   Prior to Admission medications    Medication Sig Start Date End Date Taking? Authorizing Provider   vitamin B-12 (CYANOCOBALAMIN) 1000 MCG tablet Take 1,000 mcg by mouth daily 18  Yes Historical Provider, MD   Multiple Vitamins-Minerals (THERAPEUTIC MULTIVITAMIN-MINERALS) tablet Take 1 tablet by mouth daily   Yes Historical Provider, MD   oxyCODONE-acetaminophen (PERCOCET) 5-325 MG per tablet Take 1 tablet by mouth 2 times daily. .   Yes Historical Provider, MD   calcium citrate (CALCITRATE) 950 MG tablet Take 2 tablets by mouth 2 times daily   Yes Historical Provider, MD   oxybutynin (DITROPAN) 5 MG tablet Take 5 mg by mouth 2 times daily   Yes Historical Provider, MD   gabapentin (NEURONTIN) 300 MG capsule Take 300 mg by mouth every evening   Yes Historical Provider, MD   sertraline (ZOLOFT) 50 MG tablet Take 25 mg by mouth daily    Yes Historical Provider, MD     Allergies:   Atenolol; Augmentin [amoxicillin-pot clavulanate]; and Demerol    Social History     Socioeconomic History    Marital status:      Spouse name: Not on file    Number of children: Not on file    Years of education: Not on file    Highest education level: Not on file   Occupational History    Not on file   Social Needs    Financial resource strain: Not on file    Food insecurity     Worry: Not on file     Inability: Not on file    Transportation needs     Medical: Not on file     Non-medical: Not on file   Tobacco Use    Smoking status: Never Smoker    Smokeless tobacco: Never Used   Substance and Sexual Activity    Alcohol use: No    Drug use: No    Sexual activity: Not Currently   Lifestyle    Physical activity     Days per week: Not on file     Minutes per session: Not on file    Stress: Not on file   Relationships    Social connections     Talks on phone: Not on file     Gets together: Not on file     Attends Religion service: Not on file     Active member of club or organization: Not on file     Attends meetings of clubs or organizations: Not on file     Relationship status: Not on file    Intimate partner violence     Fear of current or ex partner: Not on file     Emotionally abused: Not on file     Physically abused: Not on file     Forced sexual activity: Not on file   Other Topics Concern    Not on file   Social History Narrative    Not on file     Family History   Problem Relation Age of Onset    Heart Failure Mother          PHYSICAL EXAM:      BP (!) 195/86   Pulse 72   Temp 98.4 °F (36.9 °C) (Temporal)   Resp 16   Ht 4' 11\" (1.499 m)   Wt 164 lb 2 oz (74.4 kg)   SpO2 96%   BMI 33.15 kg/m²  I        Heart:normal    Lungs: normal    Abdomen: normal      ASA Grade:  See anesthesia note      ASSESSMENT AND PLAN:    1.   Procedure options, risks and benefits reviewed with patient and expresses understanding.

## 2020-11-09 NOTE — ANESTHESIA POSTPROCEDURE EVALUATION
Department of Anesthesiology  Postprocedure Note    Patient: Benjamine Opitz  MRN: [id number]  YOB: 1945  Date of evaluation: 11/9/2020  Time:  12:04 PM     Procedure Summary     Date:  11/09/20 Room / Location:  [address] [place] [address]    Anesthesia Start:  1455 Anesthesia Stop:  3038    Procedure:  COLORECTAL CANCER SCREENING, NOT HIGH RISK (N/A ) Diagnosis:       Screen for colon cancer      (SCREENING)    Surgeon:  Bladimir Norris MD Responsible Provider:  Angelia Vargas MD    Anesthesia Type:  MAC ASA Status:  3          Anesthesia Type: MAC    Roxie Phase I: Roxie Score: 10    Roxie Phase II: Roxie Score: 10    Last vitals: Reviewed and per EMR flowsheets.        Anesthesia Post Evaluation    Patient location during evaluation: PACU  Patient participation: complete - patient participated  Level of consciousness: awake and alert  Pain score: 0  Airway patency: patent  Nausea & Vomiting: no nausea and no vomiting  Complications: no  Cardiovascular status: blood pressure returned to baseline  Respiratory status: acceptable  Hydration status: euvolemic

## 2020-11-09 NOTE — PROCEDURES
[facility]  Endoscopy Note    Patient: Tonya Larson  : 1945  Acct#: [de-identified]    Procedure: Colonoscopy     Date:  2020    Surgeon:  Pierce Yost MD    Referring Physician:  Avelina    Previous Colonoscopy: Yes  Date: 5/3/07  Greater than 3 years? Yes    Preoperative Diagnosis:  screening    Postoperative Diagnosis:  Normal colon    Anesthesia:  See anesthesia note    Indications: This is a 76y.o. year old female who presents today with screening for colon cancer. Procedure: An informed consent was obtained from the patient after explanation of indications, benefits, possible risks and complications of the procedure. The patient was then taken to the endoscopy suite, placed in the left lateral decubitus position, and the above IV anesthesia was administered. A digital rectal examination was performed and revealed negative without mass, lesions or tenderness. The Olympus CFQ-180-AL video colonoscope was placed in the patient's rectum under digital direction and advanced to the cecum. The cecum was identified by characteristic anatomy and ballottment. The ileocecal valve was identified. The preparation was good. The scope was then withdrawn back through the cecum, ascending, transverse, descending and sigmoid colons. Carefull circumferential examination of the mucosa in these areas demonstrated normal colonic mucosa throughout. The scope was then withdrawn into the rectum and retroflexed. The retroflexed view of the anal verge and rectum demonstrates no abnormalities. The scope was straightened, the colon was decompressed and the scope was withdrawn from the patient. The patient tolerated the procedure well and was taken to the PACU in good condition. Estimated Blood Loss:  none    Impression: Normal colon    Recommendations:  Repeat colonoscopy in 10 years.     Pierce Yost MD   [facility]  2020

## 2020-11-09 NOTE — ANESTHESIA PRE PROCEDURE
[facility] Department of Anesthesiology  Pre-Anesthesia Evaluation/Consultation       Name:  Ron Jaquez  : 1945  Age:  76 y.o. MRN:  [id number]  Date: 2020           Surgeon: Surgeon(s):  Chloe Rosa MD    Procedure: Procedure(s):  COLORECTAL CANCER SCREENING, NOT HIGH RISK     Allergies   Allergen Reactions    Atenolol      diarrhea    Augmentin [Amoxicillin-Pot Clavulanate] Diarrhea    Demerol Nausea And Vomiting     Patient Active Problem List   Diagnosis    Personal history of kidney stones    [facility] [address]    Arthritis    Tremor    Hypoglycemia    Obesity    CAD (coronary artery disease)    Osteoarthritis, knee    S/P TKR (total knee replacement)    Rotator cuff injury    Closed fracture of right distal radius     Past Medical History:   Diagnosis Date    Arthritis     Cancer ([facility]     breast    Cataract     Hypoglycemia     Personal history of kidney stones     Tremor      Past Surgical History:   Procedure Laterality Date    APPENDECTOMY      BREAST SURGERY  2004    lumpectomy right     SECTION      CHOLECYSTECTOMY      COLONOSCOPY      GASTRIC BYPASS SURGERY  2000    JOINT REPLACEMENT      left knee    JOINT REPLACEMENT      right knee    KIDNEY STONE SURGERY      percutaneous    KNEE ARTHROPLASTY  [id number]    RIGHT TOTAL KNEE ARTHROPLASTY      LITHOTRIPSY      ROTATOR CUFF REPAIR  ,     bilateral    SHOULDER ARTHROPLASTY Right  15    With rotator cuff repair    TONSILLECTOMY      WRIST FRACTURE SURGERY Right 2018     RIGHT DISTAL RADIUS OPEN REDUCTION INTERNAL FIXATION -[employer]     Social History     Tobacco Use    Smoking status: Never Smoker    Smokeless tobacco: Never Used   Substance Use Topics    Alcohol use: No    Drug use: No     Medications  No current facility-administered medications on file prior to encounter.       Current Outpatient Medications on File Prior to Encounter   Medication Sig Dispense Refill    vitamin B-12 (CYANOCOBALAMIN) 1000 MCG tablet Take 1,000 mcg by mouth daily      Multiple Vitamins-Minerals (THERAPEUTIC MULTIVITAMIN-MINERALS) tablet Take 1 tablet by mouth daily      oxyCODONE-acetaminophen (PERCOCET) 5-325 MG per tablet Take 1 tablet by mouth 2 times daily. Manpreet Lisa calcium citrate (CALCITRATE) 950 MG tablet Take 2 tablets by mouth 2 times daily      oxybutynin (DITROPAN) 5 MG tablet Take 5 mg by mouth 2 times daily      gabapentin (NEURONTIN) 300 MG capsule Take 300 mg by mouth every evening      sertraline (ZOLOFT) 50 MG tablet Take 25 mg by mouth daily        Current Facility-Administered Medications   Medication Dose Route Frequency Provider Last Rate Last Dose    0.9 % sodium chloride infusion   Intravenous Continuous Nara Rajput MD        sodium chloride flush 0.9 % injection 10 mL  10 mL Intravenous 2 times per day Nara Rajput MD        sodium chloride flush 0.9 % injection 10 mL  10 mL Intravenous PRN Nara Rajput MD         Vital Signs (Current)   Vitals:    20 1401 20 1038   Pulse:  72   Resp:  16   Temp:  98.4 °F (36.9 °C)   TempSrc:  Temporal   SpO2:  96%   Weight: 167 lb (75.8 kg) 164 lb 2 oz (74.4 kg)   Height: 4' 11\" (1.499 m) 4' 11\" (1.499 m)                                          BP Readings from Last 3 Encounters:   20 (!) 187/93   18 (!) 137/97   18 (!) 144/75     Vital Signs Statistics (for past 48 hrs)     Temp  Av.4 °F (36.9 °C)  Min: 98.4 °F (36.9 °C)   Min taken time: 20 1038  Max: 98.4 °F (36.9 °C)   Max taken time: 20 1038  Pulse  Av  Min: 67   Min taken time: 20 1038  Max: 72   Max taken time: 20 1038  Resp  Av  Min: 16   Min taken time: 20 1038  Max: 16   Max taken time: 20 1038  SpO2  Av %  Min: 96 %   Min taken time: 20 1038  Max: 96 %   Max taken time: 20 1038  BP Readings from Last 3 Encounters:   20 (!) 187/93   08/09/18 (!) 137/97   06/14/18 (!) 144/75       BMI  Body mass index is 33.15 kg/m². Estimated body mass index is 33.15 kg/m² as calculated from the following:    Height as of this encounter: 4' 11\" (1.499 m). Weight as of this encounter: 164 lb 2 oz (74.4 kg). CBC   Lab Results   Component Value Date    WBC 11.0 01/25/2020    RBC 4.45 01/25/2020    RBC 4.56 12/09/2016    HGB 13.1 01/25/2020    HCT 41.0 01/25/2020    MCV 92.2 01/25/2020    RDW 14.8 01/25/2020     01/25/2020     CMP    Lab Results   Component Value Date     01/25/2020    K 3.6 01/25/2020    CL 99 01/25/2020    CO2 24 01/25/2020    BUN 9 01/25/2020    CREATININE 0.5 01/25/2020    GFRAA >60 01/25/2020    GFRAA >60 09/03/2011    AGRATIO 1.1 06/14/2018    LABGLOM >60 01/25/2020    GLUCOSE 91 01/25/2020    GLUCOSE 78 12/09/2016    PROT 7.3 06/14/2018    PROT 7.7 12/09/2016    CALCIUM 8.9 01/25/2020    BILITOT 0.6 06/14/2018    ALKPHOS 76 06/14/2018    AST 22 06/14/2018    ALT 11 06/14/2018     BMP    Lab Results   Component Value Date     01/25/2020    K 3.6 01/25/2020    CL 99 01/25/2020    CO2 24 01/25/2020    BUN 9 01/25/2020    CREATININE 0.5 01/25/2020    CALCIUM 8.9 01/25/2020    GFRAA >60 01/25/2020    GFRAA >60 09/03/2011    LABGLOM >60 01/25/2020    GLUCOSE 91 01/25/2020    GLUCOSE 78 12/09/2016     POCGlucose  No results for input(s): GLUCOSE in the last 72 hours.    Coags    Lab Results   Component Value Date    PROTIME 11.4 01/25/2020    INR 0.98 01/25/2020    APTT 31.5 96/02/5236     HCG (If Applicable) No results found for: PREGTESTUR, PREGSERUM, HCG, HCGQUANT   ABGs No results found for: PHART, PO2ART, PDI1ZQX, EYR8PJG, BEART, P4GUMPSQ   Type & Screen (If Applicable)  No results found for: LABABO, LABRH                         BMI: Wt Readings from Last 3 Encounters:       NPO Status:   Date of last liquid consumption: 11/09/20       Date of last solid food consumption: 11/07/20      Time of last solid consumption: 1800       Anesthesia Evaluation  Patient summary reviewed no history of anesthetic complications:   Airway: Mallampati: II     Neck ROM: full  Mouth opening: > = 3 FB Dental: normal exam         Pulmonary: breath sounds clear to auscultation      (-) COPD, asthma, sleep apnea and not a current smoker                           Cardiovascular:  Exercise tolerance: good (>4 METS),   (+) CAD:,     (-) hypertension, past MI, CABG/stent and  angina        Rate: normal                    Neuro/Psych:      (-) seizures, TIA and CVA           GI/Hepatic/Renal:   (+) renal disease: kidney stones,      (-) GERD       Endo/Other:    (+) : arthritis: OA., .    (-) diabetes mellitus, hypothyroidism               Abdominal:           Vascular:     - DVT and PE. Anesthesia Plan      MAC     ASA 3       Induction: intravenous. Anesthetic plan and risks discussed with patient. Plan discussed with CRNA. This pre-anesthesia assessment may be used as a history and physical.    DOS STAFF ADDENDUM:    Pt seen and examined, chart reviewed (including anesthesia, drug and allergy history). No interval changes to history and physical examination. Anesthetic plan, risks, benefits, alternatives, and personnel involved discussed with patient. Patient verbalized an understanding and agrees to proceed.       Andrew Loving MD  November 9, 2020  10:40 AM

## 2021-03-11 ENCOUNTER — HOSPITAL ENCOUNTER (OUTPATIENT)
Age: 76
Discharge: HOME | End: 2021-03-11
Payer: MEDICARE

## 2021-03-11 ENCOUNTER — HOSPITAL ENCOUNTER (OUTPATIENT)
Dept: GENERAL RADIOLOGY | Age: 76
Discharge: HOME | End: 2021-03-11
Payer: MEDICARE

## 2021-03-11 DIAGNOSIS — R31.21 ASYMPTOMATIC MICROSCOPIC HEMATURIA: ICD-10-CM

## 2021-03-11 DIAGNOSIS — N20.0 CALCULUS OF KIDNEY: ICD-10-CM

## 2021-03-11 DIAGNOSIS — N39.0 URINARY TRACT INFECTION WITHOUT HEMATURIA, SITE UNSPECIFIED: ICD-10-CM

## 2021-03-11 PROCEDURE — 74018 RADEX ABDOMEN 1 VIEW: CPT

## 2021-08-20 ENCOUNTER — HOSPITAL ENCOUNTER (OUTPATIENT)
Dept: WOMENS IMAGING | Age: 76
Discharge: HOME | End: 2021-08-20
Payer: MEDICARE

## 2021-08-20 DIAGNOSIS — Z12.31 BREAST CANCER SCREENING BY MAMMOGRAM: ICD-10-CM

## 2021-08-20 PROCEDURE — 77063 BREAST TOMOSYNTHESIS BI: CPT

## 2022-02-16 ENCOUNTER — HOSPITAL ENCOUNTER (OUTPATIENT)
Dept: MRI IMAGING | Age: 77
Discharge: HOME | End: 2022-02-16
Payer: MEDICARE

## 2022-02-16 DIAGNOSIS — M48.061 SPINAL STENOSIS OF LUMBAR REGION, UNSPECIFIED WHETHER NEUROGENIC CLAUDICATION PRESENT: ICD-10-CM

## 2022-02-16 PROCEDURE — 72148 MRI LUMBAR SPINE W/O DYE: CPT

## 2022-05-05 ENCOUNTER — HOSPITAL ENCOUNTER (OUTPATIENT)
Dept: GENERAL RADIOLOGY | Age: 77
Discharge: HOME | End: 2022-05-05
Payer: MEDICARE

## 2022-05-05 ENCOUNTER — HOSPITAL ENCOUNTER (OUTPATIENT)
Age: 77
Discharge: HOME | End: 2022-05-05
Payer: MEDICARE

## 2022-05-05 DIAGNOSIS — N20.0 CALCULUS OF KIDNEY: ICD-10-CM

## 2022-05-05 PROCEDURE — 74018 RADEX ABDOMEN 1 VIEW: CPT

## 2022-07-22 ENCOUNTER — HOSPITAL ENCOUNTER (OUTPATIENT)
Dept: PHYSICAL THERAPY | Age: 77
Setting detail: THERAPIES SERIES
Discharge: HOME | End: 2022-07-22
Payer: MEDICARE

## 2022-07-22 PROCEDURE — 97110 THERAPEUTIC EXERCISES: CPT | Performed by: CHIROPRACTOR

## 2022-07-22 PROCEDURE — 97161 PT EVAL LOW COMPLEX 20 MIN: CPT | Performed by: CHIROPRACTOR

## 2022-07-22 NOTE — PLAN OF CARE
[facility], [facility]  [address] [place], [facility]  Phone: [phone]   Fax:     [phone]                                                       Physical Therapy Certification    Dear Referring Provider (secondary): Dr. Mike Cast,    We had the pleasure of evaluating the following patient for physical therapy services at [facility]. A summary of our findings can be found in the initial assessment below. This includes our plan of care. If you have any questions or concerns regarding these findings, please do not hesitate to contact me at the office phone number checked above. Thank you for the referral.       Physician Signature:_______________________________Date:__________________  By signing above (or electronic signature), therapists plan is approved by physician        Patient: Kelly Black   : 1945   MRN: [id number]  Referring Physician: Referring Provider (secondary): Dr. Mike Cast      Evaluation Date: 2022      Medical Diagnosis Information:  Diagnosis: Back pain ( M54.16)   Treatment Diagnosis: Pain in LB radiating down R LE                                         Insurance information: PT Insurance Information: Medicare     Precautions/ Contra-indications:   Latex Allergy:  [x]NO      []YES  Preferred Language for Healthcare:   [x]English       []other:    C-SSRS Triggered by Intake questionnaire (Past 2 wk assessment ):   [] No, Questionnaire did not trigger screening. [x] Yes, Patient intake triggered C-SSRS Screening      [x] C-SSRS Screening completed  [] PCP notified via Epic   C-SSRS Triggered by Intake questionnaire:     YES NO   In the past month, have you wished you were dead or wished you could go to sleep and not wake up? X   In the past month, have you had any thoughts of killing yourself?   X Lifetime   YES NO   Have you ever done anything, started to do anything, or prepared to do anything to end your life? X             Past 3 months    X        SUBJECTIVE: Patient stated complaint: C/o pain in R buttock radiating down R LE                                Pain began following a fall ~ 2 yrs ago    Relevant Medical History:Additional Pertinent Hx: OA,CA, B TKA, Partial shoulder replacement  Functional Scale/Score: FOTO = 56    Pain Scale: Ranges 4-10/10  Easing factors: Rest  Provocative factors:  Working in her garden, excess walking     Type: []Constant   []Intermittent  []Radiating []Localized []other:     Numbness/Tingling: No    Occupation/School: Retired    Living Status/Prior Level of Function: Independent with ADLs     OBJECTIVE:   Palpation: TTP in R upper buttock    Functional Mobility/Transfers: Independent    Posture: good    Gait: (include devices/WB status) Amb well without any assistive device    Bandages/Dressings/Incisions: NA        ROM  Comments   Lumbar Flex WNL    Lumbar Ext Dec 25%      ROM LEFT RIGHT Comments   Lumbar Side Bend WNL Dec 25%    Lumbar Rotation      Quadrant      Hip Flexion      Hip Abd      Hip ER      Hip IR      Hip Extension      Knee Ext      Knee Flex      Hamstring Flex      Piriformis      Devin test                Myotomes/Strength Left  Right Comments   []ALL NORMAL MYOTOMES      Hip Abd      Hip Ext      Hip flexion (L1-L2 femoral)      Knee extension (L2-L4 femoral)      Knee flexion (S1 sciatic)      Dorsiflexion (L4-L5 deep peroneal)      Great Toe Ext (L5 deep peroneal nerve)      Ankle Eversion (S1-S2 super peroneal)      Ankle PF(S1-S2 tibial)      Multifidus      Transverse Ab 3+       Dermatomes Normal Abnormal Comments   [x]ALL NORMAL            inguinal area (L1)  [] []    anterior mid-thigh (L2) [] []    distal ant thigh/med knee (L3) [] []    medial lower leg and foot (L4) [] []    lateral lower leg and foot (L5) [] []    posterior calf (S1) [] []    medial calcaneus (S2) [] []      Reflexes Normal Abnormal Comments   []ALL NORMAL            S1-2 Seated achilles [] []    S1-2 Prone knee bend [] []    L3-4 Patellar tendon [] []    C5-6 Biceps [] []    C6 Brachioradialis [] []    C7-8 Triceps [] []    Clonus [] []    Babinski [] []    Sykes's [] []      Joint mobility:    []Normal    []Hypo   []Hyper    Neurodynamics:     MRI report   1. No acute fracture or bony destructive lesion. 2. Chronic burst compression deformity of the L1 vertebral body, resulting in   moderate central canal stenosis. Mild central canal stenosis at L4-5.   3.  Multilevel neural foraminal stenoses, worst (moderate to severe) at the   right L4-5 level. 4. Trace fluid in the facet joints at L3-4, L4-5 and L5-S1 levels likely due   to arthrosis. Neural dynamic tension testing Normal Abnormal Comments   Slump Test  - Degree of knee flexion:  [] []    SLR  [] []    0-30 [] []    30-70 [] []    Femoral nerve (L2-4) [] []       Normal Abnormal N/A Comments   Fwd Bend-aberrant or innominate mvmt) [] [] []    Trendelenburg [] [] []    Kemps/Quadrant [] [] []    Alonso Lopez [] [] []    SCARLET/Isaias [] [] []    Hip scour [] [] []    Supine to sit [] [] []    Prone knee bend [] [] []           Hip thrust [] [] []    SI distraction/compression [] [] []    Sacral Spring/thrust [] [] []               [x] Patient history, allergies, meds reviewed. Medical chart reviewed. See intake form. Review Of Systems (ROS):  [x]Performed Review of systems (Integumentary, CardioPulmonary, Neurological) by intake and observation. Intake form has been scanned into medical record. Patient has been instructed to contact their primary care physician regarding ROS issues if not already being addressed at this time.       Co-morbidities/Complexities (which will affect course of rehabilitation):   []None           Arthritic conditions   []Rheumatoid arthritis (M05.9)  [x]Osteoarthritis (M19.91) Cardiovascular conditions   []Hypertension (I10)  []Hyperlipidemia (E78.5)  []Angina pectoris (I20)  []Atherosclerosis (I70)  []CVA Musculoskeletal conditions   []Disc pathology   []Congenital spine pathologies   []Prior surgical intervention  []Osteoporosis (M81.8)  []Osteopenia (M85.8)   Endocrine conditions   []Hypothyroid (E03.9)  []Hyperthyroid Gastrointestinal conditions   []Constipation (F34.87)   Metabolic conditions   []Morbid obesity (E66.01)  []Diabetes type 1(E10.65) or 2 (E11.65)   []Neuropathy (G60.9)     Pulmonary conditions   []Asthma (J45)  []Coughing   []COPD (J44.9)   Psychological Disorders  []Anxiety (F41.9)  []Depression (F32.9)   []Other:   [x]Other:   B TKA        Barriers to/and or personal factors that will affect rehab potential:              []Age  []Sex    []Smoker              []Motivation/Lack of Motivation                        [x]Co-Morbidities              []Cognitive Function, education/learning barriers              []Environmental, home barriers              []profession/work barriers  []past PT/medical experience  []other:  Justification:     Falls Risk Assessment (30 days):   [x] Falls Risk assessed and no intervention required.   [] Falls Risk assessed and Patient requires intervention due to being higher risk   TUG score (>12s at risk):     [] Falls education provided, including:         ASSESSMENT:   Functional Impairments:     []Noted lumbar/proximal hip hypomobility   []Noted lumbosacral and/or generalized hypermobility   [x]Decreased Lumbosacral/hip/LE functional ROM   [x]Decreased core/proximal hip strength and neuromuscular control    []Decreased LE functional strength    []Abnormal reflexes/sensation/myotomal/dermatomal deficits  []Reduced balance/proprioceptive control    []other:      Functional Activity Limitations (from functional questionnaire and intake)   [x]Reduced ability to tolerate prolonged functional positions   []Reduced ability or difficulty with changes comorbidities that impact the plan of care;  [x]1-2 personal factors and/or comorbidities that impact the plan of care  []3 personal factors and/or comorbidities that impact the plan of care  [x] An examination of body systems using standardized tests and measures addressing any of the following: body structures and functions (impairments), activity limitations, and/or participation restrictions;:  [x] a total of 1-2 or more elements   [] a total of 3 or more elements   [] a total of 4 or more elements   [x] A clinical presentation with:  [x] stable and/or uncomplicated characteristics   [] evolving clinical presentation with changing characteristics  [] unstable and unpredictable characteristics;   [x] Clinical decision making of [] low, [] moderate, [] high complexity using standardized patient assessment instrument and/or measurable assessment of functional outcome. [x] EVAL (LOW) 42325 (typically 20 minutes face-to-face)  [] EVAL (MOD) 77919 (typically 30 minutes face-to-face)  [] EVAL (HIGH) 46745 (typically 45 minutes face-to-face)  [] RE-EVAL     PLAN: Begin PT focusing on: proximal hip mobilizations, LB mobs, LB core activation, proximal hip activation, and HEP    Frequency/Duration:  2 days per week for 6 Weeks:  Interventions:  [x]  Therapeutic exercise including: strength training, ROM, for LE, Glutes and core   [x]  NMR activation and proprioception for glutes , LE and Core   [x]  Manual therapy as indicated for Hip complex, LE and spine to include: Dry Needling/IASTM, STM, PROM, Gr I-IV mobilizations, manipulation. [x]  Modalities as needed that may include: thermal agents, E-stim, Biofeedback, US, iontophoresis as indicated  [x]  Patient education on joint protection, postural re-education, activity modification, progression of HEP.   []  Aquatic exercise including: strength training, ROM and balance for LE, Glutes and core     HEP instruction: Voiced understanding of home exer, posture and body mechanics    GOALS:  Patient stated goal: No pain  [] Progressing: [] Met: [] Not Met: [] Adjusted  Therapist goals for Patient:   Short Term Goals: To be achieved in: 2 weeks  1. Independent in HEP and progression per patient tolerance, in order to prevent re-injury. [] Progressing: [] Met: [] Not Met: [] Adjusted  2. Patient will have a decrease in pain to facilitate improvement in movement, function, and ADLs as indicated by Functional Deficits. [] Progressing: [] Met: [] Not Met: [] Adjusted    Long Term Goals: To be achieved in: 6 weeks  1. Increase FOTO functional outcome score from 56 to 61 to assist with reaching prior level of function. [] Progressing: [] Met: [] Not Met: [] Adjusted  2. Patient will demonstrate increased AROM to WNL, good LS mobility, good hip ROM to allow for proper joint functioning as indicated by patients Functional Deficits. [] Progressing: [] Met: [] Not Met: [] Adjusted  3. Patient will demonstrate an increase in Strength to good proximal hip and core activation to allow for proper functional mobility as indicated by patients Functional Deficits. [] Progressing: [] Met: [] Not Met: [] Adjusted  4. Patient will return to usual functional activities (including gardening)without increased symptoms or restriction. [] Progressing: [] Met: [] Not Met: [] Adjusted      Electronically signed by:  Mayelin STEELE#[id number]        Note: If patient does not return for scheduled/recommended follow up visits, this note will serve as a discharge from care along with the most recent update on progress.

## 2022-07-22 NOTE — FLOWSHEET NOTE
[facility]  [address] [facility]  Phone: [phone]   Fax:     [phone]    Physical Therapy Treatment Note/ Progress Report:     Date:  2022    Patient Name:  Vilma Canavan    :  1945  MRN: [id number]    Pertinent Medical History: Additional Pertinent Hx: OA,CA, B TKA, Partial shoulder replacement    Medical/Treatment Diagnosis Information:  Diagnosis: Back pain ( M54.16)  Treatment Diagnosis: Pain in LB radiating down R LE    Insurance/Certification information:   Medicare  Physician Information:  Dr. Gissel Bird of care signed (Y/N):  Faxed    Date of Patient follow up with Physician:      Progress Report: []  Yes  [x]  No    Date Range for reporting period:  Beginnin2022  Ending:    Progress report due (10 Rx/or 30 days whichever is less):      Recertification due (POC duration/ or 90 days whichever is less):     Visit # POC/ Insurance Allowable Auth Needed    BOMN []Yes    No   []  Functional Outcomes Measure:   Date Assessed: at eval  Test:FOTO  Score:56    Pain level:  Ranges 4-10/10     History of Injury:   LBP since having a fall ~ 2 yrs ago    SUBJECTIVE:  See eval    OBJECTIVE:   Observation:   Test measurements:      RESTRICTIONS/PRECAUTIONS:     Exercises/Interventions:     Therapeutic Ex (69792)   Min: Resistance/Reps Notes/Cues   SLR 0# - 1x10   R/L    LTR X10   R/L    SKTC x10 Min increased pain   Bridges x10    TA sets x10    Hklg abd Green  x20    Add squeeze x20              Therapeutic Activity (07469) Min:                          NMR re-education (34514)   Min:                         Manual Intervention (23895) Min:       B LE traction          Modalities  Min:             Other Therapeutic Activities:  Pt was educated on PT POC, Diagnosis, Prognosis, pathomechanics as well as frequency and duration of scheduling future physical therapy appointments. Time was also taken on this day to answer all patient questions and participation in PT. Reviewed appointment policy in detail with patient and patient verbalized understanding. Home Exercise Program: Patient instructed in the following for HEP:   . Patient verbalized/demonstrated understanding and was issued written handout. Therapeutic Exercise and NMR EXR  [] (20380) Provided verbal/tactile cueing for activities related to strengthening, flexibility, endurance, ROM  for improvements in proximal hip and core control with self care, mobility, lifting and ambulation.  [] (13257) Provided verbal/tactile cueing for activities related to improving balance, coordination, kinesthetic sense, posture, motor skill, proprioception  to assist with core control in self care, mobility, lifting, and ambulation.      Therapeutic Activities:    [] (54014 or 70484) Provided verbal/tactile cueing for activities related to improving balance, coordination, kinesthetic sense, posture, motor skill, proprioception and motor activation to allow for proper function  with self care and ADLs  [] (77908) Provided training and instruction to the patient for proper core and proximal hip recruitment and positioning with ambulation re-education     Home Exercise Program:    [] (09100) Reviewed/Progressed HEP activities related to strengthening, flexibility, endurance, ROM of core, proximal hip and LE for functional self-care, mobility, lifting and ambulation   [] (42705) Reviewed/Progressed HEP activities related to improving balance, coordination, kinesthetic sense, posture, motor skill, proprioception of core, proximal hip and LE for self care, mobility, lifting, and ambulation      Manual Treatments:  PROM / STM / Oscillations-Mobs:  G-I, II, III, IV (PA's, Inf., Post.)  [] (02857) Provided manual therapy to mobilize proximal hip and LS spine soft tissue/joints for the purpose of modulating pain, promoting relaxation,  increasing ROM, reducing/eliminating soft tissue swelling/inflammation/restriction, improving soft tissue extensibility and allowing for proper ROM for normal function with self care, mobility, lifting and ambulation. If [facility] Please Indicate Time In/Out  CPT Code Time in Time out                                   Approval Dates:  CPT Code Units Approved Units Used  Date Updated:                     Charges:  Timed Code Treatment Minutes: 30   Total Treatment Minutes: 45     [x] EVAL (LOW) 06190 (typically 20 minutes face-to-face)  [] EVAL (MOD) 67666 (typically 30 minutes face-to-face)  [] EVAL (HIGH) 16580 (typically 45 minutes face-to-face)  [] RE-EVAL     [x] BN(55798) x  2   [] Dry needle 1 or 2 Muscles (20626)  [] NMR (18479) x     [] Dry needle 3+ Muscles (08835)  [] Manual (85001) x     [] Ultrasound (38016) x  [] TA (53040) x     [] Mech Traction (48289)  [] ES(attended) (31792)     [] ES (un) (77289):   [] Vasopump (82392) [] Ionto (03353)   [] Other:    GOALS:   Patient stated goal: No pain  [] Progressing: [] Met: [] Not Met: [] Adjusted  Therapist goals for Patient:   Short Term Goals: To be achieved in: 2 weeks  1. Independent in HEP and progression per patient tolerance, in order to prevent re-injury. [] Progressing: [] Met: [] Not Met: [] Adjusted  2. Patient will have a decrease in pain to facilitate improvement in movement, function, and ADLs as indicated by Functional Deficits. [] Progressing: [] Met: [] Not Met: [] Adjusted    Long Term Goals: To be achieved in: 6 weeks  1. Increase FOTO functional outcome score from 56 to 61 to assist with reaching prior level of function. [] Progressing: [] Met: [] Not Met: [] Adjusted  2. Patient will demonstrate increased AROM to WNL, good LS mobility, good hip ROM to allow for proper joint functioning as indicated by patients Functional Deficits. [] Progressing: [] Met: [] Not Met: [] Adjusted  3.  Patient will demonstrate an increase in Strength to good proximal hip and core activation to allow for proper functional mobility as indicated by patients Functional Deficits. [] Progressing: [] Met: [] Not Met: [] Adjusted  4. Patient will return to usual functional activities (including gardening)without increased symptoms or restriction. [] Progressing: [] Met: [] Not Met: [] Adjusted      ASSESSMENT:  See eval    Treatment/Activity Tolerance:  [x] Patient tolerated treatment well [] Patient limited by fatique  [] Patient limited by pain  [] Patient limited by other medical complications  [] Other:     Overall Progression Towards Functional goals/ Treatment Progress Update:  [] Patient is progressing as expected towards functional goals listed. [] Progression is slowed due to complexities/Impairments listed. [] Progression has been slowed due to co-morbidities. [x] Plan just implemented, too soon to assess goals progression <30days   [] Goals require adjustment due to lack of progress  [] Patient is not progressing as expected and requires additional follow up with physician  [] Other:    Prognosis for POC: [x] Good [] Fair  [] Poor    Patient requires continued skilled intervention: [x] Yes  [] No        PLAN: See eval  [] Continue per plan of care [] Alter current plan (see comments)  [x] Plan of care initiated [] Hold pending MD visit [] Discharge    Electronically signed by: Millie GRAHAM#[id number]    Note: If patient does not return for scheduled/recommended follow up visits, this note will serve as a discharge from care along with the most recent update on progress.

## 2022-07-25 ENCOUNTER — HOSPITAL ENCOUNTER (OUTPATIENT)
Dept: PHYSICAL THERAPY | Age: 77
Setting detail: THERAPIES SERIES
Discharge: HOME | End: 2022-07-25
Payer: MEDICARE

## 2022-07-25 PROCEDURE — 97110 THERAPEUTIC EXERCISES: CPT

## 2022-07-25 NOTE — FLOWSHEET NOTE
[facility]  [address] [facility]  Phone: [phone]   Fax:     [phone]    Physical Therapy Treatment Note/ Progress Report:     Date:  2022    Patient Name:  Sandip Munguia    :  1945  MRN: [id number]    Pertinent Medical History: Additional Pertinent Hx: OA,CA, B TKA, Partial shoulder replacement, osteoporosis    Medical/Treatment Diagnosis Information:  Diagnosis: Back pain ( M54.16)  Treatment Diagnosis: Pain in LB radiating down R LE    Insurance/Certification information:   Medicare  Physician Information:  Dr. Valiente  of care signed (Y/N): Faxed    Date of Patient follow up with Physician:      Progress Report: []  Yes  [x]  No    Date Range for reporting period:  Beginnin2022  Ending:    Progress report due (10 Rx/or 30 days whichever is less):      Recertification due (POC duration/ or 90 days whichever is less):     Visit # POC/ Insurance Allowable Auth Needed    Denisse Lopes at 13 visits  []Yes    No   []  Functional Outcomes Measure:   Date Assessed: at eval  Test:FOTO  Score:56    Pain level:  Ranges  3-4 /10     History of Injury:   LBP since having a fall ~ 2 yrs ago    SUBJECTIVE:    22  tolerated first visit ok. At present R LB, no LE pain. Took muscle relaxes this am.  Reports ice helpful. H/o compression fx 2 years ago L-1. Had ANGEL in April, no help. Reports not surgical candidate due to osteoporosis.          OBJECTIVE:   Observation:   Test measurements:      RESTRICTIONS/PRECAUTIONS:     Exercises/Interventions:     Therapeutic Ex (67285)   Min: Resistance/Reps Notes/Cues   SLR 0# - 1x10   R/L    LTR X10   R/L    SKTC x10 gentle   Bridges x10    TA sets x10    Hklg abd Green  x20    Add squeeze x20    GS 5 sec  x 10          Therapeutic Activity (79419) Min:                          NMR re-education (68884)   Min: Manual Intervention (12726) Min:       B LE traction supine 10 sec x 6   Felt good        Modalities  Min:             Other Therapeutic Activities:  Pt was educated on PT POC, Diagnosis, Prognosis, pathomechanics as well as frequency and duration of scheduling future physical therapy appointments. Time was also taken on this day to answer all patient questions and participation in PT. Reviewed appointment policy in detail with patient and patient verbalized understanding. Home Exercise Program: Patient instructed in the following for HEP:   . Patient verbalized/demonstrated understanding and was issued written handout. Access Code: [email or web address]  URL: [email or web address]  Date: 07/25/2022  Prepared by: Elias Pipe    Exercises  Supine Gluteal Sets - 1-2 x daily - 7 x weekly - 10 reps - 5 sec hold      Therapeutic Exercise and NMR EXR  [] (58128) Provided verbal/tactile cueing for activities related to strengthening, flexibility, endurance, ROM  for improvements in proximal hip and core control with self care, mobility, lifting and ambulation.  [] (54227) Provided verbal/tactile cueing for activities related to improving balance, coordination, kinesthetic sense, posture, motor skill, proprioception  to assist with core control in self care, mobility, lifting, and ambulation.      Therapeutic Activities:    [] (55757 or 77753) Provided verbal/tactile cueing for activities related to improving balance, coordination, kinesthetic sense, posture, motor skill, proprioception and motor activation to allow for proper function  with self care and ADLs  [] (88410) Provided training and instruction to the patient for proper core and proximal hip recruitment and positioning with ambulation re-education     Home Exercise Program:    [] (14404) Reviewed/Progressed HEP activities related to strengthening, flexibility, endurance, ROM of core, proximal hip and LE for functional self-care, mobility, lifting and ambulation   [] (51481) Reviewed/Progressed HEP activities related to improving balance, coordination, kinesthetic sense, posture, motor skill, proprioception of core, proximal hip and LE for self care, mobility, lifting, and ambulation      Manual Treatments:  PROM / STM / Oscillations-Mobs:  G-I, II, III, IV (PA's, Inf., Post.)  [] (34980) Provided manual therapy to mobilize proximal hip and LS spine soft tissue/joints for the purpose of modulating pain, promoting relaxation,  increasing ROM, reducing/eliminating soft tissue swelling/inflammation/restriction, improving soft tissue extensibility and allowing for proper ROM for normal function with self care, mobility, lifting and ambulation. I    Approval Dates:  CPT Code Units Approved Units Used  Date Updated:                     Charges:  Timed Code Treatment Minutes: 30   Total Treatment Minutes: 30     [] EVAL (LOW) 04288 (typically 20 minutes face-to-face)  [] EVAL (MOD) 92433 (typically 30 minutes face-to-face)  [] EVAL (HIGH) 86265 (typically 45 minutes face-to-face)  [] RE-EVAL     [x] CV(89598) x  2   [] Dry needle 1 or 2 Muscles (37124)  [] NMR (54682) x     [] Dry needle 3+ Muscles (92081)  [] Manual (14474) x     [] Ultrasound (03160) x  [] TA (21310) x     [] Mech Traction (88727)  [] ES(attended) (79361)     [] ES (un) (10625):   [] Vasopump (71324) [] Ionto (73912)   [] Other:    GOALS:   Patient stated goal: No pain  [] Progressing: [] Met: [] Not Met: [] Adjusted  Therapist goals for Patient:   Short Term Goals: To be achieved in: 2 weeks  1. Independent in HEP and progression per patient tolerance, in order to prevent re-injury. [] Progressing: [] Met: [] Not Met: [] Adjusted  2. Patient will have a decrease in pain to facilitate improvement in movement, function, and ADLs as indicated by Functional Deficits. [] Progressing: [] Met: [] Not Met: [] Adjusted    Long Term Goals: To be achieved in: 6 weeks  1.  Increase FOTO functional outcome score from 56 to 61 to assist with reaching prior level of function. [] Progressing: [] Met: [] Not Met: [] Adjusted  2. Patient will demonstrate increased AROM to WNL, good LS mobility, good hip ROM to allow for proper joint functioning as indicated by patients Functional Deficits. [] Progressing: [] Met: [] Not Met: [] Adjusted  3. Patient will demonstrate an increase in Strength to good proximal hip and core activation to allow for proper functional mobility as indicated by patients Functional Deficits. [] Progressing: [] Met: [] Not Met: [] Adjusted  4. Patient will return to usual functional activities (including gardening)without increased symptoms or restriction. [] Progressing: [] Met: [] Not Met: [] Adjusted      ASSESSMENT:    7-25-22  no c/o of LBP/ LE pain after Rx. Instructed to avoid excessive flex/ twisting due to osteoporosis. Treatment/Activity Tolerance:  [x] Patient tolerated treatment well [] Patient limited by fatique  [] Patient limited by pain  [] Patient limited by other medical complications  [] Other:     Overall Progression Towards Functional goals/ Treatment Progress Update:  [] Patient is progressing as expected towards functional goals listed. [] Progression is slowed due to complexities/Impairments listed. [] Progression has been slowed due to co-morbidities.   [x] Plan just implemented, too soon to assess goals progression <30days   [] Goals require adjustment due to lack of progress  [] Patient is not progressing as expected and requires additional follow up with physician  [] Other:    Prognosis for POC: [x] Good [] Fair  [] Poor    Patient requires continued skilled intervention: [x] Yes  [] No        PLAN: See eval  [] Continue per plan of care [] Alter current plan (see comments)  [x] Plan of care initiated [] Hold pending MD visit [] Discharge    Electronically signed by: Melly Pérez,     Note: If patient does not return for scheduled/recommended follow up visits, this note will serve as a discharge from care along with the most recent update on progress.

## 2022-07-27 ENCOUNTER — HOSPITAL ENCOUNTER (OUTPATIENT)
Dept: PHYSICAL THERAPY | Age: 77
Setting detail: THERAPIES SERIES
Discharge: HOME | End: 2022-07-27
Payer: MEDICARE

## 2022-07-27 PROCEDURE — 97110 THERAPEUTIC EXERCISES: CPT | Performed by: CHIROPRACTOR

## 2022-07-27 NOTE — FLOWSHEET NOTE
[facility], [facility]  [address] [facility] [facility] [facility] [place]  Phone: [phone]   Fax:     [phone]    Physical Therapy Treatment Note/ Progress Report:     Date:  2022    Patient Name:  Cj Mitchell    :  1945  MRN: [id number]    Pertinent Medical History: Additional Pertinent Hx: OA,CA, B TKA, Partial shoulder replacement, osteoporosis    Medical/Treatment Diagnosis Information:  Diagnosis: Back pain ( M54.16)  Treatment Diagnosis: Pain in LB radiating down R LE    Insurance/Certification information:   Medicare  Physician Information:  Dr. Pineda Grace of care signed (Y/N): Faxed    Date of Patient follow up with Physician:      Progress Report: []  Yes  [x]  No    Date Range for reporting period:  Beginnin2022  Ending:    Progress report due (10 Rx/or 30 days whichever is less):      Recertification due (POC duration/ or 90 days whichever is less):     Visit # POC/ Insurance Allowable Auth Needed   3/12 Randee Bound at 13 visits  []Yes    No   []  Functional Outcomes Measure:   Date Assessed: at eval  Test:FOTO  Score:56    Pain level:  Ranges  0 /10     History of Injury:   LBP since having a fall ~ 2 yrs ago    SUBJECTIVE:    22  tolerated first visit ok. At present R LB, no LE pain. Took muscle relaxes this am.  Reports ice helpful. H/o compression fx 2 years ago L-1. Had ANGEL in April, no help. Reports not surgical candidate due to osteoporosis.          OBJECTIVE:   Observation:   Test measurements:      RESTRICTIONS/PRECAUTIONS:     Exercises/Interventions:     Therapeutic Ex (48330)   Min: Resistance/Reps Notes/Cues   Ext in stand x10    R SB in stand x10         Seated reclines x10         SLR 0# - 1x10,1x5   R/L    LTR X10   R/L    SKTC x10 gentle   Bridges x10    TA sets x10    Hklg abd Green  x20    Add squeeze x20    GS 5 sec  x 10          Prone leg raises X10 R/L         Therapeutic Activity (53495) Min:                          NMR re-education (07996)   Min:                         Manual Intervention (27265) Min:       B LE traction supine 10 sec x 6   Felt good        Modalities  Min:             Other Therapeutic Activities:  Pt was educated on PT POC, Diagnosis, Prognosis, pathomechanics as well as frequency and duration of scheduling future physical therapy appointments. Time was also taken on this day to answer all patient questions and participation in PT. Reviewed appointment policy in detail with patient and patient verbalized understanding. Home Exercise Program: Patient instructed in the following for HEP:   . Patient verbalized/demonstrated understanding and was issued written handout. Access Code: [email or web address]  URL: [email or web address]  Date: 07/25/2022  Prepared by: Elias Pipe    Exercises  Supine Gluteal Sets - 1-2 x daily - 7 x weekly - 10 reps - 5 sec hold      Therapeutic Exercise and NMR EXR  [] (40105) Provided verbal/tactile cueing for activities related to strengthening, flexibility, endurance, ROM  for improvements in proximal hip and core control with self care, mobility, lifting and ambulation.  [] (47119) Provided verbal/tactile cueing for activities related to improving balance, coordination, kinesthetic sense, posture, motor skill, proprioception  to assist with core control in self care, mobility, lifting, and ambulation.      Therapeutic Activities:    [] (37262 or 55352) Provided verbal/tactile cueing for activities related to improving balance, coordination, kinesthetic sense, posture, motor skill, proprioception and motor activation to allow for proper function  with self care and ADLs  [] (57862) Provided training and instruction to the patient for proper core and proximal hip recruitment and positioning with ambulation re-education     Home Exercise Program:    [] (57809) Reviewed/Progressed HEP activities related to strengthening, flexibility, endurance, ROM of core, proximal hip and LE for functional self-care, mobility, lifting and ambulation   [] (73082) Reviewed/Progressed HEP activities related to improving balance, coordination, kinesthetic sense, posture, motor skill, proprioception of core, proximal hip and LE for self care, mobility, lifting, and ambulation      Manual Treatments:  PROM / STM / Oscillations-Mobs:  G-I, II, III, IV (PA's, Inf., Post.)  [] (60485) Provided manual therapy to mobilize proximal hip and LS spine soft tissue/joints for the purpose of modulating pain, promoting relaxation,  increasing ROM, reducing/eliminating soft tissue swelling/inflammation/restriction, improving soft tissue extensibility and allowing for proper ROM for normal function with self care, mobility, lifting and ambulation. I    Approval Dates:  CPT Code Units Approved Units Used  Date Updated:                     Charges:  Timed Code Treatment Minutes: 30   Total Treatment Minutes: 30     [] EVAL (LOW) 05350 (typically 20 minutes face-to-face)  [] EVAL (MOD) 22247 (typically 30 minutes face-to-face)  [] EVAL (HIGH) 83619 (typically 45 minutes face-to-face)  [] RE-EVAL     [x] IZ(02924) x  2   [] Dry needle 1 or 2 Muscles (82304)  [] NMR (61642) x     [] Dry needle 3+ Muscles (74190)  [] Manual (96804) x     [] Ultrasound (03336) x  [] TA (20210) x     [] Mech Traction (06183)  [] ES(attended) (48903)     [] ES (un) (71977):   [] Vasopump (35839) [] Ionto (43330)   [] Other:    GOALS:   Patient stated goal: No pain  [] Progressing: [] Met: [] Not Met: [] Adjusted  Therapist goals for Patient:   Short Term Goals: To be achieved in: 2 weeks  1. Independent in HEP and progression per patient tolerance, in order to prevent re-injury. [] Progressing: [] Met: [] Not Met: [] Adjusted  2. Patient will have a decrease in pain to facilitate improvement in movement, function, and ADLs as indicated by Functional Deficits.   [] Progressing: [] Met: [] Not Met: [] Adjusted    Long Term Goals: To be achieved in: 6 weeks  1. Increase FOTO functional outcome score from 56 to 61 to assist with reaching prior level of function. [] Progressing: [] Met: [] Not Met: [] Adjusted  2. Patient will demonstrate increased AROM to WNL, good LS mobility, good hip ROM to allow for proper joint functioning as indicated by patients Functional Deficits. [] Progressing: [] Met: [] Not Met: [] Adjusted  3. Patient will demonstrate an increase in Strength to good proximal hip and core activation to allow for proper functional mobility as indicated by patients Functional Deficits. [] Progressing: [] Met: [] Not Met: [] Adjusted  4. Patient will return to usual functional activities (including gardening)without increased symptoms or restriction. [] Progressing: [] Met: [] Not Met: [] Adjusted      ASSESSMENT:    7-25-22  no c/o of LBP/ LE pain after Rx. Instructed to avoid excessive flex/ twisting due to osteoporosis. Treatment/Activity Tolerance:  [x] Patient tolerated treatment well [] Patient limited by fatique  [] Patient limited by pain  [] Patient limited by other medical complications  [] Other:     Overall Progression Towards Functional goals/ Treatment Progress Update:  [] Patient is progressing as expected towards functional goals listed. [] Progression is slowed due to complexities/Impairments listed. [] Progression has been slowed due to co-morbidities.   [x] Plan just implemented, too soon to assess goals progression <30days   [] Goals require adjustment due to lack of progress  [] Patient is not progressing as expected and requires additional follow up with physician  [] Other:    Prognosis for POC: [x] Good [] Fair  [] Poor    Patient requires continued skilled intervention: [x] Yes  [] No        PLAN: See eval  [] Continue per plan of care [] Alter current plan (see comments)  [x] Plan of care initiated [] Hold pending MD visit [] Discharge    Electronically signed by: Juan Aldana [id number]    Note: If patient does not return for scheduled/recommended follow up visits, this note will serve as a discharge from care along with the most recent update on progress.

## 2022-08-03 ENCOUNTER — HOSPITAL ENCOUNTER (OUTPATIENT)
Dept: PHYSICAL THERAPY | Age: 77
Setting detail: THERAPIES SERIES
Discharge: HOME | End: 2022-08-03
Payer: MEDICARE

## 2022-08-03 PROCEDURE — 97110 THERAPEUTIC EXERCISES: CPT

## 2022-08-03 NOTE — FLOWSHEET NOTE
[facility], [facility]  [address] [facility] [place], [facility]  Phone: [phone]   Fax:     [phone]    Physical Therapy Treatment Note/ Progress Report:     Date:  8/3/2022    Patient Name:  Marcin Fisher    :  1945  MRN: [id number]    Pertinent Medical History: Additional Pertinent Hx: OA,CA, B TKA, Partial shoulder replacement, osteoporosis    Medical/Treatment Diagnosis Information:  Diagnosis: Back pain ( M54.16)  Treatment Diagnosis: Pain in LB radiating down R LE    Insurance/Certification information:   Medicare  Physician Information:  Dr. Duval Pac of care signed (Y/N): Faxed    Date of Patient follow up with Physician:      Progress Report: []  Yes  [x]  No    Date Range for reporting period:  Beginnin/3/2022  Ending:    Progress report due (10 Rx/or 30 days whichever is less):      Recertification due (POC duration/ or 90 days whichever is less):     Visit # POC/ Insurance Allowable Auth Needed    Rina Kawasaki at 13 visits  []Yes    No   []  Functional Outcomes Measure:   Date Assessed: at eval  Test:FOTO  Score:56    Pain level  4/10     History of Injury:   LBP since having a fall ~ 2 yrs ago    SUBJECTIVE:    22  tolerated first visit ok. At present R LB, no LE pain. Took muscle relaxes this am.  Reports ice helpful. H/o compression fx 2 years ago L-1. Had ANGEL in April, no help. Reports not surgical candidate due to osteoporosis. 8-3-22 reports was doing better, increased activity/ walking with daughter in town increased pain 4/10  entire spine, R buttock. Did have shooting pain down R leg to R foot yesterday.           OBJECTIVE:   Observation:   Test measurements:      RESTRICTIONS/PRECAUTIONS:     Exercises/Interventions:     Therapeutic Ex (85276)   Min: Resistance/Reps Notes/Cues   Ext in stand x10    R SB in stand x10         Seated reclines x10 SLR 0# - 1x10,1x5   R/L    LTR X10   R/L    SKTC  5 sec x10  R/L gentle   Bridges x10    TA sets   marching  5 sec x 10  add    Hklg abd Green  x 20    Add squeeze 5 sec x 20    GS 5 sec  x 10     Piriformis stretch add    Prone leg raises X10   R/L         Therapeutic Activity (35208) Min:  Reviewed pacing activity                        NMR re-education (93320)   Min:                         Manual Intervention (95900) Min:       B LE traction supine 10 sec x 6   Felt good        Modalities  Min:             Other Therapeutic Activities:  Pt was educated on PT POC, Diagnosis, Prognosis, pathomechanics as well as frequency and duration of scheduling future physical therapy appointments. Time was also taken on this day to answer all patient questions and participation in PT. Reviewed appointment policy in detail with patient and patient verbalized understanding. Home Exercise Program: Patient instructed in the following for HEP:   . Patient verbalized/demonstrated understanding and was issued written handout. Access Code: [email or web address]  URL: [email or web address]  Date: 07/25/2022  Prepared by: Marie Valenzuela    Exercises  Supine Gluteal Sets - 1-2 x daily - 7 x weekly - 10 reps - 5 sec hold      Therapeutic Exercise and NMR EXR  [] (09998) Provided verbal/tactile cueing for activities related to strengthening, flexibility, endurance, ROM  for improvements in proximal hip and core control with self care, mobility, lifting and ambulation.  [] (17428) Provided verbal/tactile cueing for activities related to improving balance, coordination, kinesthetic sense, posture, motor skill, proprioception  to assist with core control in self care, mobility, lifting, and ambulation.      Therapeutic Activities:    [] (28620 or 24540) Provided verbal/tactile cueing for activities related to improving balance, coordination, kinesthetic sense, posture, motor skill, proprioception and motor activation to allow for proper function  with self care and ADLs  [] (12441) Provided training and instruction to the patient for proper core and proximal hip recruitment and positioning with ambulation re-education     Home Exercise Program:    [] (52095) Reviewed/Progressed HEP activities related to strengthening, flexibility, endurance, ROM of core, proximal hip and LE for functional self-care, mobility, lifting and ambulation   [] (54729) Reviewed/Progressed HEP activities related to improving balance, coordination, kinesthetic sense, posture, motor skill, proprioception of core, proximal hip and LE for self care, mobility, lifting, and ambulation      Manual Treatments:  PROM / STM / Oscillations-Mobs:  G-I, II, III, IV (PA's, Inf., Post.)  [] (08085) Provided manual therapy to mobilize proximal hip and LS spine soft tissue/joints for the purpose of modulating pain, promoting relaxation,  increasing ROM, reducing/eliminating soft tissue swelling/inflammation/restriction, improving soft tissue extensibility and allowing for proper ROM for normal function with self care, mobility, lifting and ambulation. I    Approval Dates:  CPT Code Units Approved Units Used  Date Updated:                     Charges:  Timed Code Treatment Minutes: 40   Total Treatment Minutes: 40     [] EVAL (LOW) 17644 (typically 20 minutes face-to-face)  [] EVAL (MOD) 95657 (typically 30 minutes face-to-face)  [] EVAL (HIGH) 18941 (typically 45 minutes face-to-face)  [] RE-EVAL     [x] FM(01445) x  3   [] Dry needle 1 or 2 Muscles (10992)  [] NMR (85588) x     [] Dry needle 3+ Muscles (68500)  [] Manual (96517) x     [] Ultrasound (15714) x  [] TA (38097) x     [] Mech Traction (87180)  [] ES(attended) (14403)     [] ES (un) (24356):   [] Vasopump (49497) [] Ionto (86391)   [] Other:    GOALS:   Patient stated goal: No pain  [] Progressing: [] Met: [] Not Met: [] Adjusted  Therapist goals for Patient:   Short Term Goals: To be achieved in: 2 weeks  1.  Independent in HEP and progression per patient tolerance, in order to prevent re-injury. [] Progressing: [] Met: [] Not Met: [] Adjusted  2. Patient will have a decrease in pain to facilitate improvement in movement, function, and ADLs as indicated by Functional Deficits. [] Progressing: [] Met: [] Not Met: [] Adjusted    Long Term Goals: To be achieved in: 6 weeks  1. Increase FOTO functional outcome score from 56 to 61 to assist with reaching prior level of function. [] Progressing: [] Met: [] Not Met: [] Adjusted  2. Patient will demonstrate increased AROM to WNL, good LS mobility, good hip ROM to allow for proper joint functioning as indicated by patients Functional Deficits. [] Progressing: [] Met: [] Not Met: [] Adjusted  3. Patient will demonstrate an increase in Strength to good proximal hip and core activation to allow for proper functional mobility as indicated by patients Functional Deficits. [] Progressing: [] Met: [] Not Met: [] Adjusted  4. Patient will return to usual functional activities (including gardening)without increased symptoms or restriction. [] Progressing: [] Met: [] Not Met: [] Adjusted      ASSESSMENT:    7-25-22  no c/o of LBP/ LE pain after Rx. Instructed to avoid excessive flex/ twisting due to osteoporosis. 8-3-22 pain decreased after Rx 3/10. Treatment/Activity Tolerance:  [x] Patient tolerated treatment well [] Patient limited by fatique  [] Patient limited by pain  [] Patient limited by other medical complications  [] Other:     Overall Progression Towards Functional goals/ Treatment Progress Update:  [] Patient is progressing as expected towards functional goals listed. [] Progression is slowed due to complexities/Impairments listed. [] Progression has been slowed due to co-morbidities.   [x] Plan just implemented, too soon to assess goals progression <30days   [] Goals require adjustment due to lack of progress  [] Patient is not progressing as expected and requires additional follow up with physician  [] Other:    Prognosis for POC: [x] Good [] Fair  [] Poor    Patient requires continued skilled intervention: [x] Yes  [] No        PLAN: See eval  [] Continue per plan of care [] Alter current plan (see comments)  [x] Plan of care initiated [] Hold pending MD visit [] Discharge    Electronically signed by: Pepepr Quinn,     Note: If patient does not return for scheduled/recommended follow up visits, this note will serve as a discharge from care along with the most recent update on progress.

## 2022-08-05 ENCOUNTER — HOSPITAL ENCOUNTER (OUTPATIENT)
Dept: PHYSICAL THERAPY | Age: 77
Setting detail: THERAPIES SERIES
Discharge: HOME | End: 2022-08-05
Payer: MEDICARE

## 2022-08-05 PROCEDURE — 97110 THERAPEUTIC EXERCISES: CPT | Performed by: CHIROPRACTOR

## 2022-08-05 NOTE — FLOWSHEET NOTE
[facility], [facility]  [address] [facility] [place], [facility]  Phone: [phone]   Fax:     [phone]    Physical Therapy Treatment Note/ Progress Report:     Date:  2022    Patient Name:  Alis Centeno    :  1945  MRN: [id number]    Pertinent Medical History: Additional Pertinent Hx: OA,CA, B TKA, Partial shoulder replacement, osteoporosis    Medical/Treatment Diagnosis Information:  Diagnosis: Back pain ( M54.16)  Treatment Diagnosis: Pain in LB radiating down R LE    Insurance/Certification information:   Medicare  Physician Information:  Dr. Harry Martin of care signed (Y/N): Faxed    Date of Patient follow up with Physician:      Progress Report: []  Yes  [x]  No    Date Range for reporting period:  Beginnin2022  Ending:    Progress report due (10 Rx/or 30 days whichever is less):      Recertification due (POC duration/ or 90 days whichever is less):     Visit # POC/ Insurance Allowable Auth Needed    Markie Scott at 13 visits  []Yes    No   []  Functional Outcomes Measure:   Date Assessed: at eval  Test:FOTO  Score:56    Pain level  3/10     History of Injury:   LBP since having a fall ~ 2 yrs ago    SUBJECTIVE:    22  tolerated first visit ok. At present R LB, no LE pain. Took muscle relaxes this am.  Reports ice helpful. H/o compression fx 2 years ago L-1. Had ANGEL in April, no help. Reports not surgical candidate due to osteoporosis. 8-3-22 reports was doing better, increased activity/ walking with daughter in town increased pain 4/10  entire spine, R buttock. Did have shooting pain down R leg to R foot yesterday.           OBJECTIVE:   Observation:   Test measurements:      RESTRICTIONS/PRECAUTIONS:     Exercises/Interventions:     Therapeutic Ex (12876)   Min: Resistance/Reps Notes/Cues   Ext in stand x10    R SB in stand x10         Seated reclines x10 SLR 0# - 2x10  R/L    LTR X12  R/L    SKTC  5 sec x10  R/L gentle   Bridges x10    TA sets   marching  5 sec x 10  add    Hklg abd Green  x 20    Add squeeze 5 sec x 20    GS 5 sec  x 10     Piriformis stretch 20 sec x 3     R/L    Prone leg raises X10   R/L         Therapeutic Activity (20807) Min:  Reviewed pacing activity                        NMR re-education (50499)   Min:                         Manual Intervention (07278) Min:       B LE traction supine 10 sec x 6   Felt good        Modalities  Min:             Other Therapeutic Activities:  Pt was educated on PT POC, Diagnosis, Prognosis, pathomechanics as well as frequency and duration of scheduling future physical therapy appointments. Time was also taken on this day to answer all patient questions and participation in PT. Reviewed appointment policy in detail with patient and patient verbalized understanding. Home Exercise Program: Patient instructed in the following for HEP:   . Patient verbalized/demonstrated understanding and was issued written handout. Access Code: [email or web address]  URL: [email or web address]  Date: 07/25/2022  Prepared by: Dennis Sarah    Exercises  Supine Gluteal Sets - 1-2 x daily - 7 x weekly - 10 reps - 5 sec hold      Therapeutic Exercise and NMR EXR  [] (00854) Provided verbal/tactile cueing for activities related to strengthening, flexibility, endurance, ROM  for improvements in proximal hip and core control with self care, mobility, lifting and ambulation.  [] (73666) Provided verbal/tactile cueing for activities related to improving balance, coordination, kinesthetic sense, posture, motor skill, proprioception  to assist with core control in self care, mobility, lifting, and ambulation.      Therapeutic Activities:    [] (69463 or 69703) Provided verbal/tactile cueing for activities related to improving balance, coordination, kinesthetic sense, posture, motor skill, proprioception and motor activation to allow for proper function  with self care and ADLs  [] (14735) Provided training and instruction to the patient for proper core and proximal hip recruitment and positioning with ambulation re-education     Home Exercise Program:    [] (12859) Reviewed/Progressed HEP activities related to strengthening, flexibility, endurance, ROM of core, proximal hip and LE for functional self-care, mobility, lifting and ambulation   [] (77613) Reviewed/Progressed HEP activities related to improving balance, coordination, kinesthetic sense, posture, motor skill, proprioception of core, proximal hip and LE for self care, mobility, lifting, and ambulation      Manual Treatments:  PROM / STM / Oscillations-Mobs:  G-I, II, III, IV (PA's, Inf., Post.)  [] (82464) Provided manual therapy to mobilize proximal hip and LS spine soft tissue/joints for the purpose of modulating pain, promoting relaxation,  increasing ROM, reducing/eliminating soft tissue swelling/inflammation/restriction, improving soft tissue extensibility and allowing for proper ROM for normal function with self care, mobility, lifting and ambulation. I    Approval Dates:  CPT Code Units Approved Units Used  Date Updated:                     Charges:  Timed Code Treatment Minutes: 40   Total Treatment Minutes: 40     [] EVAL (LOW) 84019 (typically 20 minutes face-to-face)  [] EVAL (MOD) 44738 (typically 30 minutes face-to-face)  [] EVAL (HIGH) 18088 (typically 45 minutes face-to-face)  [] RE-EVAL     [x] LM(35409) x  3   [] Dry needle 1 or 2 Muscles (89130)  [] NMR (33108) x     [] Dry needle 3+ Muscles (09800)  [] Manual (69799) x     [] Ultrasound (20485) x  [] TA (06366) x     [] Mech Traction (23001)  [] ES(attended) (60594)     [] ES (un) (90900):   [] Vasopump (36532) [] Ionto (34932)   [] Other:    GOALS:   Patient stated goal: No pain  [] Progressing: [] Met: [] Not Met: [] Adjusted  Therapist goals for Patient:   Short Term Goals: To be achieved in: 2 weeks  1. Independent in HEP and progression per patient tolerance, in order to prevent re-injury. [] Progressing: [] Met: [] Not Met: [] Adjusted  2. Patient will have a decrease in pain to facilitate improvement in movement, function, and ADLs as indicated by Functional Deficits. [] Progressing: [] Met: [] Not Met: [] Adjusted    Long Term Goals: To be achieved in: 6 weeks  1. Increase FOTO functional outcome score from 56 to 61 to assist with reaching prior level of function. [] Progressing: [] Met: [] Not Met: [] Adjusted  2. Patient will demonstrate increased AROM to WNL, good LS mobility, good hip ROM to allow for proper joint functioning as indicated by patients Functional Deficits. [] Progressing: [] Met: [] Not Met: [] Adjusted  3. Patient will demonstrate an increase in Strength to good proximal hip and core activation to allow for proper functional mobility as indicated by patients Functional Deficits. [] Progressing: [] Met: [] Not Met: [] Adjusted  4. Patient will return to usual functional activities (including gardening)without increased symptoms or restriction. [] Progressing: [] Met: [] Not Met: [] Adjusted      ASSESSMENT:    7-25-22  no c/o of LBP/ LE pain after Rx. Instructed to avoid excessive flex/ twisting due to osteoporosis. 8-3-22 pain decreased after Rx 3/10. Treatment/Activity Tolerance:  [x] Patient tolerated treatment well [] Patient limited by fatique  [] Patient limited by pain  [] Patient limited by other medical complications  [] Other:     Overall Progression Towards Functional goals/ Treatment Progress Update:  [] Patient is progressing as expected towards functional goals listed. [] Progression is slowed due to complexities/Impairments listed. [] Progression has been slowed due to co-morbidities.   [x] Plan just implemented, too soon to assess goals progression <30days   [] Goals require adjustment due to lack of progress  [] Patient is not progressing as expected and requires additional follow up with physician  [] Other:    Prognosis for POC: [x] Good [] Fair  [] Poor    Patient requires continued skilled intervention: [x] Yes  [] No        PLAN: See eval  [] Continue per plan of care [] Alter current plan (see comments)  [x] Plan of care initiated [] Hold pending MD visit [] Discharge    Electronically signed by: Rosana SHERMAN#[id number]    Note: If patient does not return for scheduled/recommended follow up visits, this note will serve as a discharge from care along with the most recent update on progress.

## 2022-08-10 ENCOUNTER — HOSPITAL ENCOUNTER (OUTPATIENT)
Dept: PHYSICAL THERAPY | Age: 77
Setting detail: THERAPIES SERIES
Discharge: HOME | End: 2022-08-10
Payer: MEDICARE

## 2022-08-10 PROCEDURE — 97110 THERAPEUTIC EXERCISES: CPT

## 2022-08-10 NOTE — FLOWSHEET NOTE
[facility], [facility]  [address] [facility] [place], [facility]  Phone: [phone]   Fax:     [phone]    Physical Therapy Treatment Note/ Progress Report:     Date:  8/10/2022    Patient Name:  Shayan Villarreal    :  1945  MRN: [id number]    Pertinent Medical History: Additional Pertinent Hx: OA,CA, B TKA, Partial shoulder replacement, osteoporosis    Medical/Treatment Diagnosis Information:  Diagnosis: Back pain ( M54.16)  Treatment Diagnosis: Pain in LB radiating down R LE    Insurance/Certification information:   Medicare  Physician Information:  Dr. Rita Abarca of care signed (Y/N): Faxed    Date of Patient follow up with Physician:      Progress Report: []  Yes  [x]  No    Date Range for reporting period:  Beginnin/10/2022  Ending:    Progress report due (10 Rx/or 30 days whichever is less):      Recertification due (POC duration/ or 90 days whichever is less):     Visit # POC/ Insurance Allowable Auth Needed    Dionne Sauce at 13 visits  []Yes    No   []  Functional Outcomes Measure:   Date Assessed: at eval  Test:FOTO  Score:56    Pain level  2/10     History of Injury:   LBP since having a fall ~ 2 yrs ago    SUBJECTIVE:    22  tolerated first visit ok. At present R LB, no LE pain. Took muscle relaxes this am.  Reports ice helpful. H/o compression fx 2 years ago L-1. Had ANGEL in April, no help. Reports not surgical candidate due to osteoporosis. 8-3-22 reports was doing better, increased activity/ walking with daughter in town increased pain 4/10  entire spine, R buttock. Did have shooting pain down R leg to R foot yesterday. 8-10-22 c/o L lateral trunk, 4/10  feels leg pull to the sides  last Rx aggravated. LBP 2/10, no R buttock, R LE pain the last few days. B toes ache intermittent. Sleeping better. Driving good. Painted porch floor over weekend.         OBJECTIVE: coordination, kinesthetic sense, posture, motor skill, proprioception  to assist with core control in self care, mobility, lifting, and ambulation. Therapeutic Activities:    [] (36097 or 40922) Provided verbal/tactile cueing for activities related to improving balance, coordination, kinesthetic sense, posture, motor skill, proprioception and motor activation to allow for proper function  with self care and ADLs  [] (51373) Provided training and instruction to the patient for proper core and proximal hip recruitment and positioning with ambulation re-education     Home Exercise Program:    [] (44086) Reviewed/Progressed HEP activities related to strengthening, flexibility, endurance, ROM of core, proximal hip and LE for functional self-care, mobility, lifting and ambulation   [] (46544) Reviewed/Progressed HEP activities related to improving balance, coordination, kinesthetic sense, posture, motor skill, proprioception of core, proximal hip and LE for self care, mobility, lifting, and ambulation      Manual Treatments:  PROM / STM / Oscillations-Mobs:  G-I, II, III, IV (PA's, Inf., Post.)  [] (16941) Provided manual therapy to mobilize proximal hip and LS spine soft tissue/joints for the purpose of modulating pain, promoting relaxation,  increasing ROM, reducing/eliminating soft tissue swelling/inflammation/restriction, improving soft tissue extensibility and allowing for proper ROM for normal function with self care, mobility, lifting and ambulation.      I    Approval Dates:  CPT Code Units Approved Units Used  Date Updated:                     Charges:  Timed Code Treatment Minutes: 42   Total Treatment Minutes: 52     [] EVAL (LOW) 80758 (typically 20 minutes face-to-face)  [] EVAL (MOD) 04964 (typically 30 minutes face-to-face)  [] EVAL (HIGH) 18581 (typically 45 minutes face-to-face)  [] RE-EVAL     [x] WI(38648) x  3   [] Dry needle 1 or 2 Muscles (05437)  [] NMR (68924) x     [] Dry needle 3+ Muscles (89008)  [] Manual (75174) x     [] Ultrasound (94649) x  [] TA (58252) x     [] Mech Traction (71759)  [] ES(attended) (62067)     [] ES (un) (42492):   [] Vasopump (85192) [] Ionto (90641)   [] Other:    GOALS:   Patient stated goal: No pain  [] Progressing: [] Met: [] Not Met: [] Adjusted  Therapist goals for Patient:   Short Term Goals: To be achieved in: 2 weeks  1. Independent in HEP and progression per patient tolerance, in order to prevent re-injury. [] Progressing: [] Met: [] Not Met: [] Adjusted  2. Patient will have a decrease in pain to facilitate improvement in movement, function, and ADLs as indicated by Functional Deficits. [] Progressing: [] Met: [] Not Met: [] Adjusted    Long Term Goals: To be achieved in: 6 weeks  1. Increase FOTO functional outcome score from 56 to 61 to assist with reaching prior level of function. [] Progressing: [] Met: [] Not Met: [] Adjusted  2. Patient will demonstrate increased AROM to WNL, good LS mobility, good hip ROM to allow for proper joint functioning as indicated by patients Functional Deficits. [] Progressing: [] Met: [] Not Met: [] Adjusted  3. Patient will demonstrate an increase in Strength to good proximal hip and core activation to allow for proper functional mobility as indicated by patients Functional Deficits. [] Progressing: [] Met: [] Not Met: [] Adjusted  4. Patient will return to usual functional activities (including gardening)without increased symptoms or restriction. [] Progressing: [] Met: [] Not Met: [] Adjusted      ASSESSMENT:    7-25-22  no c/o of LBP/ LE pain after Rx. Instructed to avoid excessive flex/ twisting due to osteoporosis. 8-3-22 pain decreased after Rx 3/10.   8-10-22 discussed precautions / recommendations when resuming aquatic ex at [facility]. Has not done aquatic ex x 8 mo. No increased discomfort with ex today. No sharp pain with deep breath, just mild discomfort.   L lateral trunk still sore after ice,

## 2022-08-12 ENCOUNTER — HOSPITAL ENCOUNTER (OUTPATIENT)
Dept: PHYSICAL THERAPY | Age: 77
Setting detail: THERAPIES SERIES
Discharge: HOME | End: 2022-08-12
Payer: MEDICARE

## 2022-08-12 PROCEDURE — 97110 THERAPEUTIC EXERCISES: CPT | Performed by: CHIROPRACTOR

## 2022-08-12 NOTE — FLOWSHEET NOTE
[facility], [facility]  [address] [facility] [facility] [place], [facility]  Phone: [phone]   Fax:     [phone]    Physical Therapy Treatment Note/ Progress Report:     Date:  2022    Patient Name:  Kendra Shrestha    :  1945  MRN: [id number]    Pertinent Medical History: Additional Pertinent Hx: OA,CA, B TKA, Partial shoulder replacement, osteoporosis    Medical/Treatment Diagnosis Information:  Diagnosis: Back pain ( M54.16)  Treatment Diagnosis: Pain in LB radiating down R LE    Insurance/Certification information:   Medicare  Physician Information:  Dr. Desirae Milligan of care signed (Y/N): Faxed    Date of Patient follow up with Physician:      Progress Report: []  Yes  [x]  No    Date Range for reporting period:  Beginnin2022  Ending:    Progress report due (10 Rx/or 30 days whichever is less):      Recertification due (POC duration/ or 90 days whichever is less):     Visit # POC/ Insurance Allowable Auth Needed    Ivette William at 13 visits  []Yes    No   []  Functional Outcomes Measure:   Date Assessed: at eval  Test:FOTO  Score:56    Pain level  /10     History of Injury:   LBP since having a fall ~ 2 yrs ago    SUBJECTIVE:    22  tolerated first visit ok. At present R LB, no LE pain. Took muscle relaxes this am.  Reports ice helpful. H/o compression fx 2 years ago L-1. Had ANGEL in April, no help. Reports not surgical candidate due to osteoporosis. 8-3-22 reports was doing better, increased activity/ walking with daughter in town increased pain 4/10  entire spine, R buttock. Did have shooting pain down R leg to R foot yesterday. 8-10-22 c/o L lateral trunk, 4/10  feels leg pull to the sides  last Rx aggravated. LBP 2/10, no R buttock, R LE pain the last few days. B toes ache intermittent. Sleeping better. Driving good. Painted porch floor over weekend.         OBJECTIVE: Observation:   Test measurements:      RESTRICTIONS/PRECAUTIONS:     Exercises/Interventions:     Therapeutic Ex (27968)   Min: Resistance/Reps Notes/Cues   Ext in stand x10    R SB in stand x10 Pain free ROM        Seated reclines x10         SLR 0# - 2x10  R/L    LTR X10 R/L    SKTC  5 sec x10  R/L gentle   Bridges x10    TA sets/    Marching alternating  5 sec x 10   X 10    Hklg abd Green  x 20    Add squeeze 5 sec x 20    GS 5 sec  x 10     Piriformis stretch 20 sec x 3     R/L    Prone leg raises X10   R/L Cues for tech   Paloff press Add if greta         Therapeutic Activity (88392) Min:  Reviewed pacing activity                        NMR re-education (22304)   Min:                         Manual Intervention (04582) Min:       B LE traction supine 10 sec x 6   Felt good        Modalities  Min:      Ice  LB/ L lateral trunk 15 min seated chair      Other Therapeutic Activities:  Pt was educated on PT POC, Diagnosis, Prognosis, pathomechanics as well as frequency and duration of scheduling future physical therapy appointments. Time was also taken on this day to answer all patient questions and participation in PT. Reviewed appointment policy in detail with patient and patient verbalized understanding. Home Exercise Program: Patient instructed in the following for HEP:   . Patient verbalized/demonstrated understanding and was issued written handout. Access Code: [email or web address]  URL: [email or web address]  Date: 07/25/2022  Prepared by: Marie Valenzuela    Exercises  Supine Gluteal Sets - 1-2 x daily - 7 x weekly - 10 reps - 5 sec hold      Therapeutic Exercise and NMR EXR  [] (02506) Provided verbal/tactile cueing for activities related to strengthening, flexibility, endurance, ROM  for improvements in proximal hip and core control with self care, mobility, lifting and ambulation.  [] (61615) Provided verbal/tactile cueing for activities related to improving balance, coordination, kinesthetic sense, posture, motor skill, proprioception  to assist with core control in self care, mobility, lifting, and ambulation. Therapeutic Activities:    [] (76057 or 60448) Provided verbal/tactile cueing for activities related to improving balance, coordination, kinesthetic sense, posture, motor skill, proprioception and motor activation to allow for proper function  with self care and ADLs  [] (04350) Provided training and instruction to the patient for proper core and proximal hip recruitment and positioning with ambulation re-education     Home Exercise Program:    [] (63633) Reviewed/Progressed HEP activities related to strengthening, flexibility, endurance, ROM of core, proximal hip and LE for functional self-care, mobility, lifting and ambulation   [] (00874) Reviewed/Progressed HEP activities related to improving balance, coordination, kinesthetic sense, posture, motor skill, proprioception of core, proximal hip and LE for self care, mobility, lifting, and ambulation      Manual Treatments:  PROM / STM / Oscillations-Mobs:  G-I, II, III, IV (PA's, Inf., Post.)  [] (94153) Provided manual therapy to mobilize proximal hip and LS spine soft tissue/joints for the purpose of modulating pain, promoting relaxation,  increasing ROM, reducing/eliminating soft tissue swelling/inflammation/restriction, improving soft tissue extensibility and allowing for proper ROM for normal function with self care, mobility, lifting and ambulation.      I    Approval Dates:  CPT Code Units Approved Units Used  Date Updated:                     Charges:  Timed Code Treatment Minutes: 40   Total Treatment Minutes: 55     [] EVAL (LOW) 79322 (typically 20 minutes face-to-face)  [] EVAL (MOD) 65432 (typically 30 minutes face-to-face)  [] EVAL (HIGH) 68511 (typically 45 minutes face-to-face)  [] RE-EVAL     [x] HW(75364) x  3   [] Dry needle 1 or 2 Muscles (87390)  [] NMR (69871) x     [] Dry needle 3+ Muscles (16662)  [] Manual (20765) x     [] home, if persists or worsens see MD.           Treatment/Activity Tolerance:  [x] Patient tolerated treatment well [] Patient limited by fatique  [] Patient limited by pain  [] Patient limited by other medical complications  [] Other:     Overall Progression Towards Functional goals/ Treatment Progress Update:  [] Patient is progressing as expected towards functional goals listed. [] Progression is slowed due to complexities/Impairments listed. [] Progression has been slowed due to co-morbidities. [x] Plan just implemented, too soon to assess goals progression <30days   [] Goals require adjustment due to lack of progress  [] Patient is not progressing as expected and requires additional follow up with physician  [] Other:    Prognosis for POC: [x] Good [] Fair  [] Poor    Patient requires continued skilled intervention: [x] Yes  [] No        PLAN: re-assess L lateral trunk pain if persists. [] Continue per plan of care [] Alter current plan (see comments)  [x] Plan of care initiated [] Hold pending MD visit [] Discharge    Electronically signed by: Gerald CHAMPION#[id number]    Note: If patient does not return for scheduled/recommended follow up visits, this note will serve as a discharge from care along with the most recent update on progress.

## 2022-08-17 ENCOUNTER — HOSPITAL ENCOUNTER (OUTPATIENT)
Dept: PHYSICAL THERAPY | Age: 77
Setting detail: THERAPIES SERIES
Discharge: HOME | End: 2022-08-17
Payer: MEDICARE

## 2022-08-17 PROCEDURE — G0283 ELEC STIM OTHER THAN WOUND: HCPCS

## 2022-08-17 NOTE — FLOWSHEET NOTE
[facility], [facility]  [address] [facility] [place], [facility]  Phone: [phone]   Fax:     [phone]    Physical Therapy Treatment Note/ Progress Report:     Date:  2022    Patient Name:  Waldemar Hendrix    :  1945  MRN: [id number]    Pertinent Medical History: Additional Pertinent Hx: OA,CA, B TKA, Partial shoulder replacement, osteoporosis    Medical/Treatment Diagnosis Information:  Diagnosis: Back pain ( M54.16)  Treatment Diagnosis: Pain in LB radiating down R LE    Insurance/Certification information:   Medicare  Physician Information:  Dr. Kin León of care signed (Y/N): Faxed    Date of Patient follow up with Physician:      Progress Report: []  Yes  [x]  No    Date Range for reporting period:  Beginnin2022  Ending:    Progress report due (10 Rx/or 30 days whichever is less):      Recertification due (POC duration/ or 90 days whichever is less):     Visit # POC/ Insurance Allowable Auth Needed    Diss at 13 visits  []Yes    No   []  Functional Outcomes Measure:   Date Assessed: at eval  Test:FOTO  Score:56    Pain level  3/10     History of Injury:   LBP since having a fall ~ 2 yrs ago    SUBJECTIVE:    22  tolerated first visit ok. At present R LB, no LE pain. Took muscle relaxes this am.  Reports ice helpful. H/o compression fx 2 years ago L-1. Had ANGEL in April, no help. Reports not surgical candidate due to osteoporosis. 8-3-22 reports was doing better, increased activity/ walking with daughter in town increased pain 4/10  entire spine, R buttock. Did have shooting pain down R leg to R foot yesterday. 8-10-22 c/o L lateral trunk, 4/10  feels leg pull to the sides  last Rx aggravated. LBP 2/10, no R buttock, R LE pain the last few days. B toes ache intermittent. Sleeping better. Driving good. Painted porch floor over weekend.   22  only occassionally R buttock and LE to ankle. C/o B LBP and L lateral trunk. Feels  L lateral trunk may be due to lifting dog 22#. Overall 40% improved. Feels good when does ex. OBJECTIVE:   Observation:   Test measurements:      RESTRICTIONS/PRECAUTIONS:     Exercises/Interventions:     Therapeutic Ex (43454)   Min: Resistance/Reps Notes/Cues   Ext in stand x10    R SB in stand x10 Pain free ROM/ gentle        Seated reclines x10         SLR Deferred by error / resume   LTR X10 R/L    SKTC  5 sec x10  R/L gentle   Bridges Deferred by error / resume   TA sets/    Marching alternating  5 sec x 10   X 10    Hklg abd Green  x 20    Add squeeze 5 sec x 20    GS 5 sec  x 10     Piriformis stretch 20 sec x 3     R/L Use belt for assistance   Prone leg raises X10   R/L Cues for tech   Paloff press Yellow TB x 10 R/L         Therapeutic Activity (11477) Min:  Reviewed pacing activity                        NMR re-education (56403)   Min:                         Manual Intervention (26655) Min:       B LE traction supine 10 sec x 6    gentleFelt good        Modalities  Min:      Ice  LB/ L lateral trunk 15 min seated chair      Other Therapeutic Activities:  Pt was educated on PT POC, Diagnosis, Prognosis, pathomechanics as well as frequency and duration of scheduling future physical therapy appointments. Time was also taken on this day to answer all patient questions and participation in PT. Reviewed appointment policy in detail with patient and patient verbalized understanding. Home Exercise Program: Patient instructed in the following for HEP:   . Patient verbalized/demonstrated understanding and was issued written handout. Access Code: [email or web address]  URL: [email or web address]  Date: 07/25/2022  Prepared by: Waleska Lowing    Exercises  Supine Gluteal Sets - 1-2 x daily - 7 x weekly - 10 reps - 5 sec hold      Therapeutic Exercise and NMR EXR  [] (26154) Provided verbal/tactile cueing for activities related to strengthening, flexibility, endurance, ROM  for improvements in proximal hip and core control with self care, mobility, lifting and ambulation.  [] (20867) Provided verbal/tactile cueing for activities related to improving balance, coordination, kinesthetic sense, posture, motor skill, proprioception  to assist with core control in self care, mobility, lifting, and ambulation. Therapeutic Activities:    [] (69629 or 13644) Provided verbal/tactile cueing for activities related to improving balance, coordination, kinesthetic sense, posture, motor skill, proprioception and motor activation to allow for proper function  with self care and ADLs  [] (87125) Provided training and instruction to the patient for proper core and proximal hip recruitment and positioning with ambulation re-education     Home Exercise Program:    [] (54856) Reviewed/Progressed HEP activities related to strengthening, flexibility, endurance, ROM of core, proximal hip and LE for functional self-care, mobility, lifting and ambulation   [] (99094) Reviewed/Progressed HEP activities related to improving balance, coordination, kinesthetic sense, posture, motor skill, proprioception of core, proximal hip and LE for self care, mobility, lifting, and ambulation      Manual Treatments:  PROM / STM / Oscillations-Mobs:  G-I, II, III, IV (PA's, Inf., Post.)  [] (40104) Provided manual therapy to mobilize proximal hip and LS spine soft tissue/joints for the purpose of modulating pain, promoting relaxation,  increasing ROM, reducing/eliminating soft tissue swelling/inflammation/restriction, improving soft tissue extensibility and allowing for proper ROM for normal function with self care, mobility, lifting and ambulation.      I    Approval Dates:  CPT Code Units Approved Units Used  Date Updated:                     Charges:  Timed Code Treatment Minutes: 40   Total Treatment Minutes: 55     [] EVAL (LOW) 28395 (typically 20 minutes face-to-face)  [] EVAL (MOD) 44940 (typically 30 minutes face-to-face)  [] EVAL (HIGH) 32691 (typically 45 minutes face-to-face)  [] RE-EVAL     [x] JG(54096) x  3   [] Dry needle 1 or 2 Muscles (67600)  [] NMR (34001) x     [] Dry needle 3+ Muscles (24953)  [] Manual (64359) x     [] Ultrasound (62450) x  [] TA (74370) x     [] Mech Traction (27473)  [] ES(attended) (33943)     [] ES (un) (93564):   [] Vasopump (61000) [] Ionto (21573)   [] Other:    GOALS:   Patient stated goal: No pain  [] Progressing: [] Met: [] Not Met: [] Adjusted  Therapist goals for Patient:   Short Term Goals: To be achieved in: 2 weeks  1. Independent in HEP and progression per patient tolerance, in order to prevent re-injury. [] Progressing: [] Met: [] Not Met: [] Adjusted  2. Patient will have a decrease in pain to facilitate improvement in movement, function, and ADLs as indicated by Functional Deficits. [] Progressing: [] Met: [] Not Met: [] Adjusted    Long Term Goals: To be achieved in: 6 weeks  1. Increase FOTO functional outcome score from 56 to 61 to assist with reaching prior level of function. [] Progressing: [] Met: [] Not Met: [] Adjusted  2. Patient will demonstrate increased AROM to WNL, good LS mobility, good hip ROM to allow for proper joint functioning as indicated by patients Functional Deficits. [] Progressing: [] Met: [] Not Met: [] Adjusted  3. Patient will demonstrate an increase in Strength to good proximal hip and core activation to allow for proper functional mobility as indicated by patients Functional Deficits. [] Progressing: [] Met: [] Not Met: [] Adjusted  4. Patient will return to usual functional activities (including gardening)without increased symptoms or restriction. [] Progressing: [] Met: [] Not Met: [] Adjusted      ASSESSMENT:    7-25-22  no c/o of LBP/ LE pain after Rx. Instructed to avoid excessive flex/ twisting due to osteoporosis.    8-3-22 pain decreased after Rx 3/10.   8-10-22 discussed precautions / recommendations when resuming aquatic ex at [facility]. Has not done aquatic ex x 8 mo. No increased discomfort with ex today. No sharp pain with deep breath, just mild discomfort. L lateral trunk still sore after ice, recommended possible trial of heat at home, if persists or worsens see MD.   8-17-22 progressing slowly           Treatment/Activity Tolerance:  [x] Patient tolerated treatment well [] Patient limited by fatique  [] Patient limited by pain  [] Patient limited by other medical complications  [] Other:     Overall Progression Towards Functional goals/ Treatment Progress Update:  [] Patient is progressing as expected towards functional goals listed. [] Progression is slowed due to complexities/Impairments listed. [] Progression has been slowed due to co-morbidities. [x] Plan just implemented, too soon to assess goals progression <30days   [] Goals require adjustment due to lack of progress  [] Patient is not progressing as expected and requires additional follow up with physician  [] Other:    Prognosis for POC: [x] Good [] Fair  [] Poor    Patient requires continued skilled intervention: [x] Yes  [] No        PLAN: re-assess L lateral trunk pain if persists. [] Continue per plan of care [] Alter current plan (see comments)  [x] Plan of care initiated [] Hold pending MD visit [] Discharge    Electronically signed by: Matt Lion,     Note: If patient does not return for scheduled/recommended follow up visits, this note will serve as a discharge from care along with the most recent update on progress.

## 2022-08-19 ENCOUNTER — HOSPITAL ENCOUNTER (OUTPATIENT)
Dept: PHYSICAL THERAPY | Age: 77
Setting detail: THERAPIES SERIES
Discharge: HOME | End: 2022-08-19
Payer: MEDICARE

## 2022-08-19 PROCEDURE — G0283 ELEC STIM OTHER THAN WOUND: HCPCS | Performed by: CHIROPRACTOR

## 2022-08-19 PROCEDURE — 97110 THERAPEUTIC EXERCISES: CPT | Performed by: CHIROPRACTOR

## 2022-08-19 NOTE — FLOWSHEET NOTE
[facility], [facility]  [address] [place], [facility]  Phone: [phone]   Fax:     [phone]    Physical Therapy Treatment Note/ Progress Report:     Date:  2022    Patient Name:  Sarah Gomez    :  1945  MRN: [id number]    Pertinent Medical History: Additional Pertinent Hx: OA,CA, B TKA, Partial shoulder replacement, osteoporosis    Medical/Treatment Diagnosis Information:  Diagnosis: Back pain ( M54.16)  Treatment Diagnosis: Pain in LB radiating down R LE    Insurance/Certification information:   Medicare  Physician Information:  Dr. Clara Bragg of care signed (Y/N): Faxed    Date of Patient follow up with Physician:      Progress Report: []  Yes  [x]  No    Date Range for reporting period:  Beginnin2022  Ending:    Progress report due (10 Rx/or 30 days whichever is less):      Recertification due (POC duration/ or 90 days whichever is less):     Visit # POC/ Insurance Allowable Auth Needed    Ada Sexton at 13 visits  []Yes    No   []  Functional Outcomes Measure:   Date Assessed: at eval  Test:FOTO  Score:56    Pain level  3/10     History of Injury:   LBP since having a fall ~ 2 yrs ago    SUBJECTIVE:    22  tolerated first visit ok. At present R LB, no LE pain. Took muscle relaxes this am.  Reports ice helpful. H/o compression fx 2 years ago L-1. Had ANGEL in April, no help. Reports not surgical candidate due to osteoporosis. 8-3-22 reports was doing better, increased activity/ walking with daughter in town increased pain 4/10  entire spine, R buttock. Did have shooting pain down R leg to R foot yesterday. 8-10-22 c/o L lateral trunk, 4/10  feels leg pull to the sides  last Rx aggravated. LBP 2/10, no R buttock, R LE pain the last few days. B toes ache intermittent. Sleeping better. Driving good. Painted porch floor over weekend.   22  only occassionally R buttock and LE to ankle. C/o B LBP and L lateral trunk. Feels  L lateral trunk may be due to lifting dog 22#. Overall 40% improved. Feels good when does ex. OBJECTIVE:   Observation:   Test measurements:      RESTRICTIONS/PRECAUTIONS:     Exercises/Interventions:     Therapeutic Ex (07008)   Min: Resistance/Reps Notes/Cues   Ext in stand x10    R SB in stand x10 Pain free ROM/ gentle        Seated reclines x10         SLR 0# - 2x10  R/L    LTR X10 R/L    SKTC  5 sec x10  R/L gentle   Bridges x10    TA sets/    Marching alternating  5 sec x 10   X 10    Hklg abd Green  x 20    Add squeeze 5 sec x 20    GS 5 sec  x 10     Piriformis stretch 20 sec x 3     R/L Use belt for assistance   Prone leg raises X10   R/L Cues for tech   Paloff press Yellow TB x 10 R/L         Therapeutic Activity (50782) Min:  Reviewed pacing activity                        NMR re-education (64955)   Min:                         Manual Intervention (21889) Min:       B LE traction supine 10 sec x 6    Gentle Felt good        Modalities  Min:      E-stim        (sit) IFC with CP to LB x15 min       Other Therapeutic Activities:  Pt was educated on PT POC, Diagnosis, Prognosis, pathomechanics as well as frequency and duration of scheduling future physical therapy appointments. Time was also taken on this day to answer all patient questions and participation in PT. Reviewed appointment policy in detail with patient and patient verbalized understanding. Home Exercise Program: Patient instructed in the following for HEP:   . Patient verbalized/demonstrated understanding and was issued written handout. Access Code: [email or web address]  URL: [email or web address]  Date: 07/25/2022  Prepared by: Lenore Baker    Exercises  Supine Gluteal Sets - 1-2 x daily - 7 x weekly - 10 reps - 5 sec hold      Therapeutic Exercise and NMR EXR  [] (47761) Provided verbal/tactile cueing for activities related to strengthening, flexibility, endurance, ROM  for improvements in proximal hip and core control with self care, mobility, lifting and ambulation.  [] (07996) Provided verbal/tactile cueing for activities related to improving balance, coordination, kinesthetic sense, posture, motor skill, proprioception  to assist with core control in self care, mobility, lifting, and ambulation. Therapeutic Activities:    [] (13759 or 76098) Provided verbal/tactile cueing for activities related to improving balance, coordination, kinesthetic sense, posture, motor skill, proprioception and motor activation to allow for proper function  with self care and ADLs  [] (76880) Provided training and instruction to the patient for proper core and proximal hip recruitment and positioning with ambulation re-education     Home Exercise Program:    [] (30123) Reviewed/Progressed HEP activities related to strengthening, flexibility, endurance, ROM of core, proximal hip and LE for functional self-care, mobility, lifting and ambulation   [] (71212) Reviewed/Progressed HEP activities related to improving balance, coordination, kinesthetic sense, posture, motor skill, proprioception of core, proximal hip and LE for self care, mobility, lifting, and ambulation      Manual Treatments:  PROM / STM / Oscillations-Mobs:  G-I, II, III, IV (PA's, Inf., Post.)  [] (45599) Provided manual therapy to mobilize proximal hip and LS spine soft tissue/joints for the purpose of modulating pain, promoting relaxation,  increasing ROM, reducing/eliminating soft tissue swelling/inflammation/restriction, improving soft tissue extensibility and allowing for proper ROM for normal function with self care, mobility, lifting and ambulation.      I    Approval Dates:  CPT Code Units Approved Units Used  Date Updated:                     Charges:  Timed Code Treatment Minutes: 40   Total Treatment Minutes: 55     [] EVAL (LOW) 89993 (typically 20 minutes face-to-face)  [] EVAL (MOD) 17358 (typically 30 minutes face-to-face)  [] EVAL (HIGH) 31057 (typically 45 minutes face-to-face)  [] RE-EVAL     [x] LK(42034) x  3   [] Dry needle 1 or 2 Muscles (81335)  [] NMR (91013) x     [] Dry needle 3+ Muscles (75157)  [] Manual (56378) x     [] Ultrasound (47731) x  [] TA (50268) x     [] Mech Traction (50425)  [] ES(attended) (21926)     [x] ES (un) (15094):   [] Vasopump (54828) [] Ionto (06942)   [] Other:    GOALS:   Patient stated goal: No pain  [] Progressing: [] Met: [] Not Met: [] Adjusted  Therapist goals for Patient:   Short Term Goals: To be achieved in: 2 weeks  1. Independent in HEP and progression per patient tolerance, in order to prevent re-injury. [] Progressing: [] Met: [] Not Met: [] Adjusted  2. Patient will have a decrease in pain to facilitate improvement in movement, function, and ADLs as indicated by Functional Deficits. [] Progressing: [] Met: [] Not Met: [] Adjusted    Long Term Goals: To be achieved in: 6 weeks  1. Increase FOTO functional outcome score from 56 to 61 to assist with reaching prior level of function. [] Progressing: [] Met: [] Not Met: [] Adjusted  2. Patient will demonstrate increased AROM to WNL, good LS mobility, good hip ROM to allow for proper joint functioning as indicated by patients Functional Deficits. [] Progressing: [] Met: [] Not Met: [] Adjusted  3. Patient will demonstrate an increase in Strength to good proximal hip and core activation to allow for proper functional mobility as indicated by patients Functional Deficits. [] Progressing: [] Met: [] Not Met: [] Adjusted  4. Patient will return to usual functional activities (including gardening)without increased symptoms or restriction. [] Progressing: [] Met: [] Not Met: [] Adjusted      ASSESSMENT:    7-25-22  no c/o of LBP/ LE pain after Rx. Instructed to avoid excessive flex/ twisting due to osteoporosis.    8-3-22 pain decreased after Rx 3/10.   8-10-22 discussed precautions /

## 2022-08-22 ENCOUNTER — HOSPITAL ENCOUNTER (OUTPATIENT)
Dept: PHYSICAL THERAPY | Age: 77
Setting detail: THERAPIES SERIES
Discharge: HOME | End: 2022-08-22
Payer: MEDICARE

## 2022-08-22 PROCEDURE — G0283 ELEC STIM OTHER THAN WOUND: HCPCS

## 2022-08-22 PROCEDURE — 97110 THERAPEUTIC EXERCISES: CPT

## 2022-08-22 NOTE — FLOWSHEET NOTE
[facility], [facility]  [address] [facility] [place], [facility]  Phone: [phone]   Fax:     [phone]    Physical Therapy Treatment Note/ Progress Report:     Date:  2022    Patient Name:  Jennifer Thurston    :  1945  MRN: [id number]    Pertinent Medical History: Additional Pertinent Hx: OA,CA, B TKA, Partial shoulder replacement, osteoporosis    Medical/Treatment Diagnosis Information:  Diagnosis: Back pain ( M54.16)  Treatment Diagnosis: Pain in LB radiating down R LE    Insurance/Certification information:   Medicare  Physician Information:  Dr. Hinojosa All of care signed (Y/N): Faxed    Date of Patient follow up with Physician:      Progress Report: []  Yes  [x]  No    Date Range for reporting period:  Beginnin2022  Ending:    Progress report due (10 Rx/or 30 days whichever is less):      Recertification due (POC duration/ or 90 days whichever is less):     Visit # POC/ Insurance Allowable Auth Needed   10/12 Waneta Danger at 13 visits  []Yes    No   []  Functional Outcomes Measure:   Date Assessed: at eval  Test:FOTO  Score:56    Pain level  2/10     History of Injury:   LBP since having a fall ~ 2 yrs ago    SUBJECTIVE:    22  tolerated first visit ok. At present R LB, no LE pain. Took muscle relaxes this am.  Reports ice helpful. H/o compression fx 2 years ago L-1. Had ANGEL in April, no help. Reports not surgical candidate due to osteoporosis. 8-3-22 reports was doing better, increased activity/ walking with daughter in town increased pain 4/10  entire spine, R buttock. Did have shooting pain down R leg to R foot yesterday. 8-10-22 c/o L lateral trunk, 4/10  feels leg pull to the sides  last Rx aggravated. LBP 2/10, no R buttock, R LE pain the last few days. B toes ache intermittent. Sleeping better. Driving good. Painted porch floor over weekend.   22  only occassionally R buttock and LE to ankle. C/o B LBP and L lateral trunk. Feels  L lateral trunk may be due to lifting dog 22#. Overall 40% improved. Feels good when does ex.   8-22-22 reports saw MD, no rib fx. Getting urine culture. Wore back brace over weekend while doing weeds, which she feels helpful. L ribs improving 2/10. Intermittent  B LBP, no leg pain. E-stim helpful. OBJECTIVE:   Observation:   Test measurements:      RESTRICTIONS/PRECAUTIONS:     Exercises/Interventions:     Therapeutic Ex (85091)   Min: Resistance/Reps Notes/Cues   Nu-step  L-0 x 2 min    gentle   Ext in stand x10    R SB in stand x10 Pain free ROM/ gentle        Seated reclines x10         SLR 0# - 2x10  R/L    LTR X10 R/L    SKTC  5 sec x10  R/L gentle   Bridges x10    TA sets/    Marching alternating  5 sec x 10   X 10    Hklg abd Green  x 20    Add squeeze 5 sec x 20    GS 5 sec  x 10     Piriformis stretch 20 sec x 3     R/L Use belt for assistance   Prone leg raises X10   R/L Cues for tech   Paloff press Yellow TB x 10 R/L    Multifidus walk out add    Therapeutic Activity (88869) Min:  Reviewed pacing activity                        NMR re-education (72739)   Min:                         Manual Intervention (45175) Min:       B LE traction supine 10 sec x 6    Gentle Felt good        Modalities  Min:      E-stim        (sit)  B LB/ L posterior/ lateral L trunk IFC with CP  LB/ L lateral trunk. x15 min       Other Therapeutic Activities:  Pt was educated on PT POC, Diagnosis, Prognosis, pathomechanics as well as frequency and duration of scheduling future physical therapy appointments. Time was also taken on this day to answer all patient questions and participation in PT. Reviewed appointment policy in detail with patient and patient verbalized understanding.      Home Exercise Program: Patient instructed in the following for HEP:   . Patient verbalized/demonstrated understanding and was issued written handout. Access Code: [email or web address]  URL: [email or web address]  Date: 07/25/2022  Prepared by: Kylie Plata    Exercises  Supine Gluteal Sets - 1-2 x daily - 7 x weekly - 10 reps - 5 sec hold      Therapeutic Exercise and NMR EXR  [] (20029) Provided verbal/tactile cueing for activities related to strengthening, flexibility, endurance, ROM  for improvements in proximal hip and core control with self care, mobility, lifting and ambulation.  [] (63881) Provided verbal/tactile cueing for activities related to improving balance, coordination, kinesthetic sense, posture, motor skill, proprioception  to assist with core control in self care, mobility, lifting, and ambulation.      Therapeutic Activities:    [] (27173 or 53599) Provided verbal/tactile cueing for activities related to improving balance, coordination, kinesthetic sense, posture, motor skill, proprioception and motor activation to allow for proper function  with self care and ADLs  [] (07812) Provided training and instruction to the patient for proper core and proximal hip recruitment and positioning with ambulation re-education     Home Exercise Program:    [] (37855) Reviewed/Progressed HEP activities related to strengthening, flexibility, endurance, ROM of core, proximal hip and LE for functional self-care, mobility, lifting and ambulation   [] (38466) Reviewed/Progressed HEP activities related to improving balance, coordination, kinesthetic sense, posture, motor skill, proprioception of core, proximal hip and LE for self care, mobility, lifting, and ambulation      Manual Treatments:  PROM / STM / Oscillations-Mobs:  G-I, II, III, IV (PA's, Inf., Post.)  [] (96623) Provided manual therapy to mobilize proximal hip and LS spine soft tissue/joints for the purpose of modulating pain, promoting relaxation,  increasing ROM, reducing/eliminating soft tissue swelling/inflammation/restriction, improving soft tissue extensibility and allowing for proper ROM for normal function with self care, mobility, lifting and ambulation. I    Approval Dates:  CPT Code Units Approved Units Used  Date Updated:                     Charges:  Timed Code Treatment Minutes: 42   Total Treatment Minutes: 57     [] EVAL (LOW) 85357 (typically 20 minutes face-to-face)  [] EVAL (MOD) 31217 (typically 30 minutes face-to-face)  [] EVAL (HIGH) 05868 (typically 45 minutes face-to-face)  [] RE-EVAL     [x] RAMIREZ(70593) x  3   [] Dry needle 1 or 2 Muscles (79482)  [] NMR (10812) x     [] Dry needle 3+ Muscles (81225)  [] Manual (03376) x     [] Ultrasound (70402) x  [] TA (88513) x     [] Mech Traction (54464)  [] ES(attended) (32240)     [x] ES (un) (50668):   [] Vasopump (47194) [] Ionto (58575)   [] Other:    GOALS:   Patient stated goal: No pain  [] Progressing: [] Met: [] Not Met: [] Adjusted  Therapist goals for Patient:   Short Term Goals: To be achieved in: 2 weeks  1. Independent in HEP and progression per patient tolerance, in order to prevent re-injury. [] Progressing: [] Met: [] Not Met: [] Adjusted  2. Patient will have a decrease in pain to facilitate improvement in movement, function, and ADLs as indicated by Functional Deficits. [] Progressing: [] Met: [] Not Met: [] Adjusted    Long Term Goals: To be achieved in: 6 weeks  1. Increase FOTO functional outcome score from 56 to 61 to assist with reaching prior level of function. [] Progressing: [] Met: [] Not Met: [] Adjusted  2. Patient will demonstrate increased AROM to WNL, good LS mobility, good hip ROM to allow for proper joint functioning as indicated by patients Functional Deficits. [] Progressing: [] Met: [] Not Met: [] Adjusted  3. Patient will demonstrate an increase in Strength to good proximal hip and core activation to allow for proper functional mobility as indicated by patients Functional Deficits. [] Progressing: [] Met: [] Not Met: [] Adjusted  4.  Patient will return to usual functional activities (including gardening)without increased symptoms or restriction. [] Progressing: [] Met: [] Not Met: [] Adjusted      ASSESSMENT:    7-25-22  no c/o of LBP/ LE pain after Rx. Instructed to avoid excessive flex/ twisting due to osteoporosis. 8-3-22 pain decreased after Rx 3/10.   8-10-22 discussed precautions / recommendations when resuming aquatic ex at [facility]. Has not done aquatic ex x 8 mo. No increased discomfort with ex today. No sharp pain with deep breath, just mild discomfort. L lateral trunk still sore after ice, recommended possible trial of heat at home, if persists or worsens see MD.   8-17-22 progressing slowly           Treatment/Activity Tolerance:  [x] Patient tolerated treatment well [] Patient limited by fatique  [] Patient limited by pain  [] Patient limited by other medical complications  [] Other:     Overall Progression Towards Functional goals/ Treatment Progress Update:  [] Patient is progressing as expected towards functional goals listed. [] Progression is slowed due to complexities/Impairments listed. [] Progression has been slowed due to co-morbidities. [x] Plan just implemented, too soon to assess goals progression <30days   [] Goals require adjustment due to lack of progress  [] Patient is not progressing as expected and requires additional follow up with physician  [] Other:    Prognosis for POC: [x] Good [] Fair  [] Poor    Patient requires continued skilled intervention: [x] Yes  [] No        PLAN: re-assess L lateral trunk pain if persists. [] Continue per plan of care [] Alter current plan (see comments)  [x] Plan of care initiated [] Hold pending MD visit [] Discharge    Electronically signed by: Nitin Montano,     Note: If patient does not return for scheduled/recommended follow up visits, this note will serve as a discharge from care along with the most recent update on progress.

## 2022-08-24 ENCOUNTER — HOSPITAL ENCOUNTER (OUTPATIENT)
Dept: PHYSICAL THERAPY | Age: 77
Setting detail: THERAPIES SERIES
Discharge: HOME | End: 2022-08-24
Payer: MEDICARE

## 2022-08-24 PROCEDURE — 97110 THERAPEUTIC EXERCISES: CPT

## 2022-08-24 PROCEDURE — G0283 ELEC STIM OTHER THAN WOUND: HCPCS

## 2022-08-24 NOTE — FLOWSHEET NOTE
[facility], [facility]  [address] [facility] [place], [facility]  Phone: [phone]   Fax:     [phone]    Physical Therapy Treatment Note/ Progress Report:     Date:  2022    Patient Name:  Kendra Shrestha    :  1945  MRN: [id number]    Pertinent Medical History: Additional Pertinent Hx: OA,CA, B TKA, Partial shoulder replacement, osteoporosis    Medical/Treatment Diagnosis Information:  Diagnosis: Back pain ( M54.16)  Treatment Diagnosis: Pain in LB radiating down R LE    Insurance/Certification information:   Medicare  Physician Information:  Dr. Desirae Milligan of care signed (Y/N): Faxed    Date of Patient follow up with Physician:      Progress Report: []  Yes  [x]  No    Date Range for reporting period:  Beginnin2022  Ending:    Progress report due (10 Rx/or 30 days whichever is less):      Recertification due (POC duration/ or 90 days whichever is less):     Visit # POC/ Insurance Allowable Auth Needed    Ivette William at 13 visits  []Yes    No   []  Functional Outcomes Measure:   Date Assessed: at eval  Test:FOTO  Score:56    Pain level  2/10     History of Injury:   LBP since having a fall ~ 2 yrs ago    SUBJECTIVE:    22  tolerated first visit ok. At present R LB, no LE pain. Took muscle relaxes this am.  Reports ice helpful. H/o compression fx 2 years ago L-1. Had ANGEL in April, no help. Reports not surgical candidate due to osteoporosis. 8-3-22 reports was doing better, increased activity/ walking with daughter in town increased pain 4/10  entire spine, R buttock. Did have shooting pain down R leg to R foot yesterday. 8-10-22 c/o L lateral trunk, 4/10  feels leg pull to the sides  last Rx aggravated. LBP 2/10, no R buttock, R LE pain the last few days. B toes ache intermittent. Sleeping better. Driving good. Painted porch floor over weekend.   22  only occassionally R buttock and LE to ankle. C/o B LBP and L lateral trunk. Feels  L lateral trunk may be due to lifting dog 22#. Overall 40% improved. Feels good when does ex.   8-22-22 reports saw MD, no rib fx. Getting urine culture. Wore back brace over weekend while doing weeds, which she feels helpful. L ribs improving 2/10. Intermittent  B LBP, no leg pain. E-stim helpful  8-24-22  overall L  ribs improved with only occasional twinge. .  LB intermittent now. No leg pain/ N/T.         OBJECTIVE:   Observation:   Test measurements:      RESTRICTIONS/PRECAUTIONS:     Exercises/Interventions:     Therapeutic Ex (21425)   Min: Resistance/Reps Notes/Cues   Nu-step  L-0 x 3 min    gentle   Ext in stand x10    R SB in stand x10 Pain free ROM/ gentle        Seated reclines x10         SLR 0# - 2x10  R/L    LTR X10 R/L    SKTC  5 sec x10  R/L gentle   Bridges x10    TA sets/    Marching alternating X 10 HEP   Hklg abd Green  x 20    Add squeeze 5 sec x 20    GS 5 sec  x 10     Piriformis stretch 20 sec x 3     R/L Use belt for assistance   Prone leg raises X10   R/L Cues for tech   Paloff press Yellow TB x 10 R/L    Multifidus walk out Yellow TB 3 steps x 3 R/L    Therapeutic Activity (05739) Min:  Reviewed pacing activity, wearing brace for heavier activities and / or getting help                        NMR re-education (32941)   Min:                         Manual Intervention (51760) Min:       B LE traction supine 10 sec x 6    Gentle Felt good        Modalities  Min:      E-stim        (sit)  B LB/ L posterior/ lateral L trunk IFC with CP  LB/ L lateral trunk. x15 min       Other Therapeutic Activities:  Pt was educated on PT POC, Diagnosis, Prognosis, pathomechanics as well as frequency and duration of scheduling future physical therapy appointments. Time was also taken on this day to answer all patient questions and participation in PT.  Reviewed appointment policy in detail with patient and patient verbalized understanding. Home Exercise Program: Patient instructed in the following for HEP:   . Patient verbalized/demonstrated understanding and was issued written handout. Access Code: [email or web address]  URL: [email or web address]  Date: 07/25/2022  Prepared by: Waleska Fine    Exercises  Supine Gluteal Sets - 1-2 x daily - 7 x weekly - 10 reps - 5 sec     Therapeutic Exercise and NMR EXR  [] (91481) Provided verbal/tactile cueing for activities related to strengthening, flexibility, endurance, ROM  for improvements in proximal hip and core control with self care, mobility, lifting and ambulation.  [] (04375) Provided verbal/tactile cueing for activities related to improving balance, coordination, kinesthetic sense, posture, motor skill, proprioception  to assist with core control in self care, mobility, lifting, and ambulation.      Therapeutic Activities:    [] (62069 or 97210) Provided verbal/tactile cueing for activities related to improving balance, coordination, kinesthetic sense, posture, motor skill, proprioception and motor activation to allow for proper function  with self care and ADLs  [] (63148) Provided training and instruction to the patient for proper core and proximal hip recruitment and positioning with ambulation re-education     Home Exercise Program:    [] (17894) Reviewed/Progressed HEP activities related to strengthening, flexibility, endurance, ROM of core, proximal hip and LE for functional self-care, mobility, lifting and ambulation   [] (66184) Reviewed/Progressed HEP activities related to improving balance, coordination, kinesthetic sense, posture, motor skill, proprioception of core, proximal hip and LE for self care, mobility, lifting, and ambulation      Manual Treatments:  PROM / STM / Oscillations-Mobs:  G-I, II, III, IV (PA's, Inf., Post.)  [] (61864) Provided manual therapy to mobilize proximal hip and LS spine soft tissue/joints for the purpose of modulating pain, mobility as indicated by patients Functional Deficits. [] Progressing: [] Met: [] Not Met: [] Adjusted  4. Patient will return to usual functional activities (including gardening)without increased symptoms or restriction. [] Progressing: [] Met: [] Not Met: [] Adjusted      ASSESSMENT:    7-25-22  no c/o of LBP/ LE pain after Rx. Instructed to avoid excessive flex/ twisting due to osteoporosis. 8-3-22 pain decreased after Rx 3/10.   8-10-22 discussed precautions / recommendations when resuming aquatic ex at [facility]. Has not done aquatic ex x 8 mo. No increased discomfort with ex today. No sharp pain with deep breath, just mild discomfort. L lateral trunk still sore after ice, recommended possible trial of heat at home, if persists or worsens see MD.   8-17-22 progressing slowly   8-24-22 progressing towards goals with decreased pain. Treatment/Activity Tolerance:  [x] Patient tolerated treatment well [] Patient limited by fatique  [] Patient limited by pain  [] Patient limited by other medical complications  [] Other:     Overall Progression Towards Functional goals/ Treatment Progress Update:  [] Patient is progressing as expected towards functional goals listed. [] Progression is slowed due to complexities/Impairments listed. [] Progression has been slowed due to co-morbidities. [x] Plan just implemented, too soon to assess goals progression <30days   [] Goals require adjustment due to lack of progress  [] Patient is not progressing as expected and requires additional follow up with physician  [] Other:    Prognosis for POC: [x] Good [] Fair  [] Poor    Patient requires continued skilled intervention: [x] Yes  [] No        PLAN: re-assess L lateral trunk pain if persists.     [] Continue per plan of care [] Alter current plan (see comments)  [x] Plan of care initiated [] Hold pending MD visit [] Discharge    Electronically signed by: Po Williamson,     Note: If patient does not return for scheduled/recommended follow up visits, this note will serve as a discharge from care along with the most recent update on progress.

## 2022-08-26 ENCOUNTER — HOSPITAL ENCOUNTER (OUTPATIENT)
Dept: GENERAL RADIOLOGY | Age: 77
Discharge: HOME | End: 2022-08-26
Payer: MEDICARE

## 2022-08-26 ENCOUNTER — HOSPITAL ENCOUNTER (OUTPATIENT)
Age: 77
Discharge: HOME | End: 2022-08-26
Payer: MEDICARE

## 2022-08-26 DIAGNOSIS — Z87.442 PERSONAL HISTORY OF URINARY CALCULI: ICD-10-CM

## 2022-08-26 DIAGNOSIS — R31.0 GROSS HEMATURIA: ICD-10-CM

## 2022-08-26 PROCEDURE — 74018 RADEX ABDOMEN 1 VIEW: CPT

## 2022-08-29 ENCOUNTER — HOSPITAL ENCOUNTER (OUTPATIENT)
Dept: PHYSICAL THERAPY | Age: 77
Setting detail: THERAPIES SERIES
Discharge: HOME | End: 2022-08-29
Payer: MEDICARE

## 2022-08-29 PROCEDURE — G0283 ELEC STIM OTHER THAN WOUND: HCPCS | Performed by: CHIROPRACTOR

## 2022-08-29 PROCEDURE — 97110 THERAPEUTIC EXERCISES: CPT | Performed by: CHIROPRACTOR

## 2022-08-29 NOTE — FLOWSHEET NOTE
[facility], [facility]  [address] [facility] [place], [facility]  Phone: [phone]   Fax:     [phone]    Physical Therapy Treatment Note/ Progress Report:     Date:  2022    Patient Name:  Waldemar Hendrix    :  1945  MRN: [id number]    Pertinent Medical History: Additional Pertinent Hx: OA,CA, B TKA, Partial shoulder replacement, osteoporosis    Medical/Treatment Diagnosis Information:  Diagnosis: Back pain ( M54.16)  Treatment Diagnosis: Pain in LB radiating down R LE    Insurance/Certification information:   Medicare  Physician Information:  Dr. Kin León of care signed (Y/N): Faxed    Date of Patient follow up with Physician:      Progress Report: []  Yes  [x]  No    Date Range for reporting period:  Beginnin2022  Ending:    Progress report due (10 Rx/or 30 days whichever is less):      Recertification due (POC duration/ or 90 days whichever is less):     Visit # POC/ Insurance Allowable Auth Needed   ean Diss at 13 visits  []Yes    No   []  Functional Outcomes Measure:   Date Assessed: 22  Test:FOTO  Score:50    Pain level  3/10     History of Injury:   LBP since having a fall ~ 2 yrs ago    SUBJECTIVE:    22  tolerated first visit ok. At present R LB, no LE pain. Took muscle relaxes this am.  Reports ice helpful. H/o compression fx 2 years ago L-1. Had ANGEL in April, no help. Reports not surgical candidate due to osteoporosis. 8-3-22 reports was doing better, increased activity/ walking with daughter in town increased pain 4/10  entire spine, R buttock. Did have shooting pain down R leg to R foot yesterday. 8-10-22 c/o L lateral trunk, 4/10  feels leg pull to the sides  last Rx aggravated. LBP 2/10, no R buttock, R LE pain the last few days. B toes ache intermittent. Sleeping better. Driving good. Painted porch floor over weekend.   22  only occassionally R buttock and LE to ankle. C/o B LBP and L lateral trunk. Feels  L lateral trunk may be due to lifting dog 22#. Overall 40% improved. Feels good when does ex.   8-22-22 reports saw MD, no rib fx. Getting urine culture. Wore back brace over weekend while doing weeds, which she feels helpful. L ribs improving 2/10. Intermittent  B LBP, no leg pain. E-stim helpful  8-24-22  overall L  ribs improved with only occasional twinge. .  LB intermittent now. No leg pain/ N/T.   8/29/22 - Attributes slight increased pain to a restless night of sleep        OBJECTIVE:   Observation:   Test measurements:  Lumbar ext improved to WNL. R SB still with 25% deficit    RESTRICTIONS/PRECAUTIONS:     Exercises/Interventions:     Therapeutic Ex (05244)   Min: Resistance/Reps Notes/Cues   Nu-step  L-0 x 3 min    gentle   Ext in stand x10    L SB in stand x10 Pain free ROM/ gentle        Seated reclines x10         SLR 0# - 2x10  R/L    LTR X10 R/L    SKTC  5 sec x10  R/L gentle   Bridges x10    TA sets/    Marching alternating X 10 HEP   Hklg abd Green  x 20    Add squeeze 5 sec x 20    GS 5 sec  x 10     Piriformis stretch 20 sec x 3     R/L Use belt for assistance   Prone leg raises X10   R/L Cues for tech   Paloff press    Multifidus walk out    Therapeutic Activity (67741) Min:  Reviewed pacing activity, wearing brace for heavier activities and / or getting help                        NMR re-education (68901)   Min:                         Manual Intervention (31118) Min:       B LE traction supine 10 sec x 6    Gentle Felt good        Modalities  Min:      E-stim        (sit)  B LB/ L posterior/ lateral L trunk IFC with CP  LB/ L lateral trunk. x15 min       Other Therapeutic Activities:  Pt was educated on PT POC, Diagnosis, Prognosis, pathomechanics as well as frequency and duration of scheduling future physical therapy appointments.  Time was also taken on this day to answer all patient questions and participation in PT. Reviewed appointment policy in detail with patient and patient verbalized understanding. Home Exercise Program: Patient instructed in the following for HEP:   . Patient verbalized/demonstrated understanding and was issued written handout. Access Code: [email or web address]  URL: [email or web address]  Date: 07/25/2022  Prepared by: Tad Amin    Exercises  Supine Gluteal Sets - 1-2 x daily - 7 x weekly - 10 reps - 5 sec     Therapeutic Exercise and NMR EXR  [] (08418) Provided verbal/tactile cueing for activities related to strengthening, flexibility, endurance, ROM  for improvements in proximal hip and core control with self care, mobility, lifting and ambulation.  [] (31790) Provided verbal/tactile cueing for activities related to improving balance, coordination, kinesthetic sense, posture, motor skill, proprioception  to assist with core control in self care, mobility, lifting, and ambulation.      Therapeutic Activities:    [] (54275 or 45033) Provided verbal/tactile cueing for activities related to improving balance, coordination, kinesthetic sense, posture, motor skill, proprioception and motor activation to allow for proper function  with self care and ADLs  [] (64554) Provided training and instruction to the patient for proper core and proximal hip recruitment and positioning with ambulation re-education     Home Exercise Program:    [] (48512) Reviewed/Progressed HEP activities related to strengthening, flexibility, endurance, ROM of core, proximal hip and LE for functional self-care, mobility, lifting and ambulation   [] (94449) Reviewed/Progressed HEP activities related to improving balance, coordination, kinesthetic sense, posture, motor skill, proprioception of core, proximal hip and LE for self care, mobility, lifting, and ambulation      Manual Treatments:  PROM / STM / Oscillations-Mobs:  G-I, II, III, IV (PA's, Inf., Post.)  [] (29142) Provided manual therapy to mobilize proximal hip and LS spine soft tissue/joints for the purpose of modulating pain, promoting relaxation,  increasing ROM, reducing/eliminating soft tissue swelling/inflammation/restriction, improving soft tissue extensibility and allowing for proper ROM for normal function with self care, mobility, lifting and ambulation. I    Approval Dates:  CPT Code Units Approved Units Used  Date Updated:                     Charges:  Timed Code Treatment Minutes: 35   Total Treatment Minutes: 50     [] EVAL (LOW) 67111 (typically 20 minutes face-to-face)  [] EVAL (MOD) 98326 (typically 30 minutes face-to-face)  [] EVAL (HIGH) 59466 (typically 45 minutes face-to-face)  [] RE-EVAL     [x] HR(93510) x   [] Dry needle 1 or 2 Muscles (49401)  [] NMR (11729) x     [] Dry needle 3+ Muscles (88375)  [] Manual (09452) x     [] Ultrasound (76510) x  [] TA (23070) x     [] Mech Traction (00863)  [] ES(attended) (95652)     [x] ES (un) (19810):   [] Vasopump (59857) [] Ionto (44258)   [] Other:    GOALS:   Patient stated goal: No pain  [] Progressing: [] Met: [x] Not Met: [] Adjusted  Therapist goals for Patient:   Short Term Goals: To be achieved in: 2 weeks  1. Independent in HEP and progression per patient tolerance, in order to prevent re-injury. [] Progressing: [x] Met: [] Not Met: [] Adjusted  2. Patient will have a decrease in pain to facilitate improvement in movement, function, and ADLs as indicated by Functional Deficits. [] Progressing: [x] Met: [] Not Met: [] Adjusted    Long Term Goals: To be achieved in: 6 weeks  1. Increase FOTO functional outcome score from 56 to 61 to assist with reaching prior level of function. [] Progressing: [] Met: [x] Not Met: [] Adjusted  2. Patient will demonstrate increased AROM to WNL, good LS mobility, good hip ROM to allow for proper joint functioning as indicated by patients Functional Deficits. [] Progressing: [x] Met: [] Not Met: [] Adjusted  3.  Patient will demonstrate an increase in Strength to good proximal hip and core activation to allow for proper functional mobility as indicated by patients Functional Deficits. [] Progressing: [x] Met: [] Not Met: [] Adjusted  4. Patient will return to usual functional activities (including gardening)without increased symptoms or restriction. [] Progressing: [] Met: [x] Not Met: [] Adjusted      ASSESSMENT:    7-25-22  no c/o of LBP/ LE pain after Rx. Instructed to avoid excessive flex/ twisting due to osteoporosis. 8-3-22 pain decreased after Rx 3/10.   8-10-22 discussed precautions / recommendations [facility]. Has not done aquatic ex x 8 mo. No increased discomfort with ex today. No sharp pain with deep breath, just mild discomfort. L lateral trunk still sore after ice, recommended possible trial of heat at home, if persists or worsens see MD.   8-17-22 progressing slowly   8-24-22 progressing towards goals with decreased pain. Treatment/Activity Tolerance:  [x] Patient tolerated treatment well [] Patient limited by fatique  [] Patient limited by pain  [] Patient limited by other medical complications  [] Other:     Overall Progression Towards Functional goals/ Treatment Progress Update:  [] Patient is progressing as expected towards functional goals listed. [] Progression is slowed due to complexities/Impairments listed. [] Progression has been slowed due to co-morbidities. [x] Plan just implemented, too soon to assess goals progression <30days   [] Goals require adjustment due to lack of progress  [] Patient is not progressing as expected and requires additional follow up with physician  [] Other:    Prognosis for POC: [x] Good [] Fair  [] Poor    Patient requires continued skilled intervention: [x] Yes  [] No        PLAN: To continue with home exer.  Pt is a [employer] member    [] Continue per plan of care [] Alter current plan (see comments)  [] Plan of care initiated [] Hold pending MD

## 2022-08-30 NOTE — DISCHARGE SUMMARY
[facility], [facility]  [address] [facility]  Phone: [phone]   Fax:     [phone]      Physical Therapy Discharge Summary    Dear Mila Purvis MD,    We had the pleasure of treating the following patient for physical therapy services at [address]. A summary of our findings can be found in the discharge summary below. This includes our final assessment. If you have any questions or concerns regarding these findings, please do not hesitate to contact me at the office phone number checked above.   Thank you for the referral.       Physician Signature:________________________________Date:__________________  By signing above, therapists plan is approved by physician          Patient: Divya Norwood   : 1945   MRN: [id number]  Referring Physician:        Evaluation Date: 2022        Medical Diagnosis Information:  Back pain radiating down R LE     Insurance/Certification information:      Date Range of Treatment: 22 - 22   Total visits:  12      Functional Outcomes Measure: at discharge  Test:  FOTO  Score:50    SUBJECTIVE:States that pain has improved and is no longer in LE        Pain Scale: 3/10    Type: []Constant   []Intermitment  []Radiating []Localized  []other:     Functional Limitations: []Sitting []Standing []Walking    []Squatting []Stairs            []ADL's  []Driving []Sports/Recreation  []Other:      OBJECTIVE:   Good lumbar mobility except minimal deficit in R SB     Response to Treatment:   []Patient met all goals and has responded well to treatment and will continue HEP   [x]Patient should continue to improve in reasonable time if they continue HEP   []Patient has plateaued and is no longer responding to skilled PT intervention    []Patient is getting worse and would benefit from return to referring MD   []Patient unable to adhere to initial POC      GOALS:   Patient stated goal: No pain  [] Progressing: [] Met: [x] Not Met: [] Adjusted  Therapist goals for Patient:   Short Term Goals: To be achieved in: 2 weeks  1. Independent in HEP and progression per patient tolerance, in order to prevent re-injury. [] Progressing: [x] Met: [] Not Met: [] Adjusted  2. Patient will have a decrease in pain to facilitate improvement in movement, function, and ADLs as indicated by Functional Deficits. [] Progressing: [x] Met: [] Not Met: [] Adjusted     Long Term Goals: To be achieved in: 6 weeks  1. Increase FOTO functional outcome score from 56 to 61 to assist with reaching prior level of function. [] Progressing: [] Met: [x] Not Met: [] Adjusted  2. Patient will demonstrate increased AROM to WNL, good LS mobility, good hip ROM to allow for proper joint functioning as indicated by patients Functional Deficits. [] Progressing: [x] Met: [] Not Met: [] Adjusted  3. Patient will demonstrate an increase in Strength to good proximal hip and core activation to allow for proper functional mobility as indicated by patients Functional Deficits. [] Progressing: [x] Met: [] Not Met: [] Adjusted  4. Patient will return to usual functional activities (including gardening)without increased symptoms or restriction. [] Progressing: [] Met: [x] Not Met: [] Adjusted           PLAN:    .  Patient will need to maintain their HEP in order to prevent re-occurrence.                        [facility] and will continue exer program    Reason for Discharge:  [] Goals Met   [] Patient did not return after initial evaluation   [x] Progress Plateaued  [] Missed _____ scheduled appointments   [] No insurance coverage [] Patient terminated therapy   [] MD discharged from PT [] Financial Limitations  [] Other:      Electronically signed by:  Abhishek Lu, #[id number]

## 2022-09-09 NOTE — PRE-PROCEDURE INSTRUCTIONS
[address] [facility]  [phone]        Pre-Op Phone Call:     Patient Name: Martha Redmond     Telephone Information:   Mobile [phone]     Home phone:  [phone]    Surgery Time:   12:10 PM     Arrival Time:  10:55 AM     Left extended Message:  Yes     Message left with:     Recent change in health status:  Yes      Instructed to bring eye drops, photo identification, and insurance card day of surgery?   Yes     Advised to wear short sleeved button down shirt (no T-shirt underneath):  Yes     Remarks:  Message left on voice mail        Electronically signed by:  Verito Jade RN at 9/9/2022 12:09 PM

## 2022-09-12 ENCOUNTER — PREP FOR PROCEDURE (OUTPATIENT)
Dept: OPHTHALMOLOGY | Age: 77
End: 2022-09-12

## 2022-09-12 ENCOUNTER — HOSPITAL ENCOUNTER (OUTPATIENT)
Dept: SURGERY | Age: 77
Discharge: HOME | End: 2022-09-12
Payer: MEDICARE

## 2022-09-12 VITALS — DIASTOLIC BLOOD PRESSURE: 90 MMHG | SYSTOLIC BLOOD PRESSURE: 193 MMHG

## 2022-09-12 PROCEDURE — 66821 AFTER CATARACT LASER SURGERY: CPT

## 2022-09-12 PROCEDURE — 6370000000 HC RX 637 (ALT 250 FOR IP): Performed by: OPHTHALMOLOGY

## 2022-09-12 RX ORDER — SODIUM CHLORIDE 9 MG/ML
INJECTION, SOLUTION INTRAVENOUS PRN
Status: DISCONTINUED | OUTPATIENT
Start: 2022-09-12 | End: 2022-09-13 | Stop reason: HOSPADM

## 2022-09-12 RX ORDER — PROPARACAINE HYDROCHLORIDE 5 MG/ML
1 SOLUTION/ DROPS OPHTHALMIC ONCE
Status: CANCELLED | OUTPATIENT
Start: 2022-09-12 | End: 2022-09-12

## 2022-09-12 RX ORDER — TROPICAMIDE 10 MG/ML
1 SOLUTION/ DROPS OPHTHALMIC ONCE
Status: COMPLETED | OUTPATIENT
Start: 2022-09-12 | End: 2022-09-12

## 2022-09-12 RX ORDER — SODIUM CHLORIDE 0.9 % (FLUSH) 0.9 %
5-40 SYRINGE (ML) INJECTION EVERY 12 HOURS SCHEDULED
Status: DISCONTINUED | OUTPATIENT
Start: 2022-09-12 | End: 2022-09-13 | Stop reason: HOSPADM

## 2022-09-12 RX ORDER — SODIUM CHLORIDE 0.9 % (FLUSH) 0.9 %
5-40 SYRINGE (ML) INJECTION PRN
Status: DISCONTINUED | OUTPATIENT
Start: 2022-09-12 | End: 2022-09-13 | Stop reason: HOSPADM

## 2022-09-12 RX ORDER — TROPICAMIDE 10 MG/ML
1 SOLUTION/ DROPS OPHTHALMIC ONCE
Status: CANCELLED | OUTPATIENT
Start: 2022-09-12 | End: 2022-09-12

## 2022-09-12 RX ORDER — PHENYLEPHRINE HCL 2.5 %
1 DROPS OPHTHALMIC (EYE) ONCE
Status: CANCELLED | OUTPATIENT
Start: 2022-09-12 | End: 2022-09-12

## 2022-09-12 RX ORDER — PHENYLEPHRINE HCL 2.5 %
1 DROPS OPHTHALMIC (EYE) ONCE
Status: COMPLETED | OUTPATIENT
Start: 2022-09-12 | End: 2022-09-12

## 2022-09-12 RX ADMIN — PHENYLEPHRINE HYDROCHLORIDE 1 DROP: 25 SOLUTION/ DROPS OPHTHALMIC at 11:23

## 2022-09-12 RX ADMIN — TROPICAMIDE 1 DROP: 10 SOLUTION/ DROPS OPHTHALMIC at 11:23

## 2022-09-12 NOTE — PROGRESS NOTES
Patient: Rolly Rivas  1945, 77 y.o./female    Ambulatory to laser room. Bilateral eye marked and numbed by Dr. Lavonia Seip. Laser procedure done and tolerated well by patient. Post  instructions given to Judy Ray by provider.       Power setting was 2.7        # of shots was 57 OD 70 OS    Total power was 153.9  OS mJ    Brenda Barnett RN

## 2022-09-12 NOTE — OP NOTE
[address]  [address] [facility]. [address] [address]  [phone]      9/12/2022    Patient name: Stacia Hodgkin  YOB: 1945  MRN: [id number]    Allergies: Allergies   Allergen Reactions    Atenolol      diarrhea    Augmentin [Amoxicillin-Pot Clavulanate] Diarrhea    Demerol Nausea And Vomiting       Pre-operative diagnosis:  After cataract obscuring vision of the RIGHT and LEFT eyes. Post-operative diagnosis:  Same    Procedure Performed:  YAG Capsulotomy of the RIGHT and LEFT eyes    Anesthesia:  None    Estimated Blood Loss: None    Specimens removed: None    Complications:  None    Description of Procedure: A Yag Capsulotomy was performed today on the RIGHT and LEFT eyes. 54 shots were used on the right eye and 70 on the left eye. Pt appears to be oriented to time, place, and person. Pre-op medications instilled in the left eye: Proparacaine 1 drop, Jhonatan-Synephrine 2.5% 1 drop topical and Mydriacyl 0.5% 1 drop topical. Patient is comfortable and will be released to self.      Electronically signed by: Alin Garcia MD,9/12/2022,12:22 PM

## 2022-11-07 ENCOUNTER — HOSPITAL ENCOUNTER (OUTPATIENT)
Dept: WOMENS IMAGING | Age: 77
Discharge: HOME | End: 2022-11-07
Payer: MEDICARE

## 2022-11-07 DIAGNOSIS — Z12.31 BREAST CANCER SCREENING BY MAMMOGRAM: ICD-10-CM

## 2022-11-07 PROCEDURE — 77067 SCR MAMMO BI INCL CAD: CPT

## 2023-04-04 ENCOUNTER — HOSPITAL ENCOUNTER (EMERGENCY)
Age: 78
Discharge: HOME | End: 2023-04-04
Attending: EMERGENCY MEDICINE
Payer: MEDICARE

## 2023-04-04 VITALS
BODY MASS INDEX: 34.8 KG/M2 | HEIGHT: 58 IN | OXYGEN SATURATION: 96 % | RESPIRATION RATE: 16 BRPM | DIASTOLIC BLOOD PRESSURE: 95 MMHG | SYSTOLIC BLOOD PRESSURE: 195 MMHG | TEMPERATURE: 97.6 F | HEART RATE: 71 BPM | WEIGHT: 165.79 LBS

## 2023-04-04 DIAGNOSIS — M25.512 CHRONIC LEFT SHOULDER PAIN: ICD-10-CM

## 2023-04-04 DIAGNOSIS — M54.12 CERVICAL RADICULOPATHY: Primary | ICD-10-CM

## 2023-04-04 DIAGNOSIS — G89.29 CHRONIC LEFT SHOULDER PAIN: ICD-10-CM

## 2023-04-04 DIAGNOSIS — R03.0 ELEVATED BLOOD PRESSURE READING: ICD-10-CM

## 2023-04-04 PROCEDURE — 6360000002 HC RX W HCPCS: Performed by: EMERGENCY MEDICINE

## 2023-04-04 PROCEDURE — 99284 EMERGENCY DEPT VISIT MOD MDM: CPT

## 2023-04-04 PROCEDURE — 96372 THER/PROPH/DIAG INJ SC/IM: CPT

## 2023-04-04 RX ORDER — TRAZODONE HYDROCHLORIDE 50 MG/1
50 TABLET ORAL NIGHTLY
COMMUNITY

## 2023-04-04 RX ORDER — MULTIVIT-MIN/IRON/FOLIC ACID/K 18-600-40
CAPSULE ORAL
COMMUNITY

## 2023-04-04 RX ORDER — METHOCARBAMOL 750 MG/1
750 TABLET, FILM COATED ORAL 4 TIMES DAILY
COMMUNITY

## 2023-04-04 RX ORDER — METHYLPREDNISOLONE 4 MG/1
TABLET ORAL
Qty: 1 KIT | Refills: 0 | Status: SHIPPED | OUTPATIENT
Start: 2023-04-04 | End: 2023-04-10

## 2023-04-04 RX ORDER — CELECOXIB 200 MG/1
200 CAPSULE ORAL 2 TIMES DAILY
COMMUNITY

## 2023-04-04 RX ORDER — DEXAMETHASONE SODIUM PHOSPHATE 4 MG/ML
4 INJECTION, SOLUTION INTRA-ARTICULAR; INTRALESIONAL; INTRAMUSCULAR; INTRAVENOUS; SOFT TISSUE ONCE
Status: COMPLETED | OUTPATIENT
Start: 2023-04-04 | End: 2023-04-04

## 2023-04-04 RX ADMIN — DEXAMETHASONE SODIUM PHOSPHATE 4 MG: 4 INJECTION, SOLUTION INTRAMUSCULAR; INTRAVENOUS at 15:57

## 2023-04-04 NOTE — ED PROVIDER NOTES
smoked. She has never used smokeless tobacco. She reports that she does not drink alcohol and does not use drugs. Family history:    Family History   Problem Relation Age of Onset    Heart Failure Mother          Exam  ED Triage Vitals [04/04/23 1459]   BP Temp Temp Source Heart Rate Resp SpO2 Height Weight   (!) 183/92 97.6 °F (36.4 °C) Oral 71 16 96 % 4' 10\" (1.473 m) 165 lb 12.6 oz (75.2 kg)     Nursing note and vitals reviewed. Constitutional: Well developed, well nourished. Non-toxic in appearance. HENT:      Head: Normocephalic and atraumatic. Ears: External ears normal.      Nose: Nose normal.     Mouth: Membrane mucosa moist and pink. Eyes: Anicteric sclera. No discharge. Neck: Supple. Trachea midline. Diffuse cervical midline and bilateral paraspinal tenderness without focal midline tenderness or step-offs. No overlying skin changes. Neck full range of motion without restriction. Cardiovascular: RRR; no murmurs, rubs, or gallops. Radial 2+ and symmetric. No carotid bruit. Pulmonary/Chest: Effort normal. No respiratory distress. CTAB. No stridor. No wheezes. No rales. Musculoskeletal: Moves all extremities. No gross deformity. Tenderness with palpation of posterior infrascapular region and bicipital groove of left shoulder. Range of motion restricted secondary to pain in full flexion and internal rotation. No joint effusion, erythema or edema. Neurological: Alert and orientedx4. Face symmetric. Speech is clear. 5 out of 5 motor and sensation grossly intact bilateral upper extremities. Skin: Warm and dry. No rash. Psychiatric: Normal mood and affect. Behavior is normal.      Radiology  No orders to display       Any applicable radiology studies including x-ray, CT, MRI, and/or ultrasound, were reviewed independently by me in addition to the radiologist.  I reviewed all radiology images and reports as well from this evaluation.     Labs  No results found for this visit on

## 2023-04-04 NOTE — ED NOTES
Left shoulder pain, this has been going on for months now. Pt has been going to physical therapy but it does not seem to be helping anymore. Pt states she has had a cortisone shot in the past which seemed to have helped. Pt states she cant sleep because of this pain. Pain is pretty constant. Pt sees a pain management doctor for her shoulder.      Uriel Maloney, JIE  04/04/23 1276

## 2023-04-04 NOTE — DISCHARGE INSTRUCTIONS
Please follow-up with your primary care physician within the next 3 to 5 days for reevaluation of your symptoms and for recheck of your blood pressure which is elevated at your emergency department visit today. Continue taking celecoxib, methocarbamol, Voltaren and Percocet as you are currently prescribed for your pain. I recommend that you start taking the Medrol Dosepak which you have been prescribed today. Please follow-up with your spine doctor or with [facility] at the above contact information for further evaluation of your neck and shoulder pain which may be due to a pinched nerve. Return to the emergency department if you have unbearable pain, fevers, chest pain, shortness of breath, new extremity weakness/numbness, vision changes or difficulty controlling your bladder. Return if you have other new or changing symptoms that concern you and you wish to be reevaluated.

## 2023-05-18 ENCOUNTER — APPOINTMENT (OUTPATIENT)
Dept: MRI IMAGING | Age: 78
DRG: 149 | End: 2023-05-18
Payer: MEDICARE

## 2023-05-18 ENCOUNTER — HOSPITAL ENCOUNTER (INPATIENT)
Age: 78
LOS: 1 days | Discharge: HOME HEALTH | DRG: 149 | End: 2023-05-21
Attending: EMERGENCY MEDICINE | Admitting: INTERNAL MEDICINE
Payer: MEDICARE

## 2023-05-18 ENCOUNTER — APPOINTMENT (OUTPATIENT)
Dept: CT IMAGING | Age: 78
DRG: 149 | End: 2023-05-18
Payer: MEDICARE

## 2023-05-18 DIAGNOSIS — R29.90 STROKE-LIKE SYMPTOM: Primary | ICD-10-CM

## 2023-05-18 PROBLEM — R42 DIZZINESS: Status: ACTIVE | Noted: 2023-05-18

## 2023-05-18 LAB
ALBUMIN SERPL-MCNC: 4 G/DL (ref 3.4–5)
ANION GAP SERPL CALCULATED.3IONS-SCNC: 7 MMOL/L (ref 3–16)
ANION GAP SERPL CALCULATED.3IONS-SCNC: 8 MMOL/L (ref 3–16)
BASOPHILS # BLD: 0.1 K/UL (ref 0–0.2)
BASOPHILS NFR BLD: 1.5 %
BUN SERPL-MCNC: 7 MG/DL (ref 7–20)
BUN SERPL-MCNC: 9 MG/DL (ref 7–20)
CALCIUM SERPL-MCNC: 8.3 MG/DL (ref 8.3–10.6)
CALCIUM SERPL-MCNC: 8.4 MG/DL (ref 8.3–10.6)
CHLORIDE SERPL-SCNC: 103 MMOL/L (ref 99–110)
CHLORIDE SERPL-SCNC: 103 MMOL/L (ref 99–110)
CHOLEST SERPL-MCNC: 163 MG/DL (ref 0–199)
CO2 SERPL-SCNC: 26 MMOL/L (ref 21–32)
CO2 SERPL-SCNC: 29 MMOL/L (ref 21–32)
CREAT SERPL-MCNC: 0.5 MG/DL (ref 0.6–1.2)
CREAT SERPL-MCNC: 0.6 MG/DL (ref 0.6–1.2)
DEPRECATED RDW RBC AUTO: 15 % (ref 12.4–15.4)
EOSINOPHIL # BLD: 0.2 K/UL (ref 0–0.6)
EOSINOPHIL NFR BLD: 1.7 %
GFR SERPLBLD CREATININE-BSD FMLA CKD-EPI: >60 ML/MIN/{1.73_M2}
GFR SERPLBLD CREATININE-BSD FMLA CKD-EPI: >60 ML/MIN/{1.73_M2}
GLUCOSE SERPL-MCNC: 111 MG/DL (ref 70–99)
GLUCOSE SERPL-MCNC: 115 MG/DL (ref 70–99)
HCT VFR BLD AUTO: 42.9 % (ref 36–48)
HDLC SERPL-MCNC: 84 MG/DL (ref 40–60)
HGB BLD-MCNC: 14.1 G/DL (ref 12–16)
LDLC SERPL CALC-MCNC: 71 MG/DL
LYMPHOCYTES # BLD: 3.6 K/UL (ref 1–5.1)
LYMPHOCYTES NFR BLD: 37 %
MAGNESIUM SERPL-MCNC: 1.1 MG/DL (ref 1.8–2.4)
MAGNESIUM SERPL-MCNC: 3.2 MG/DL (ref 1.8–2.4)
MCH RBC QN AUTO: 29.6 PG (ref 26–34)
MCHC RBC AUTO-ENTMCNC: 32.9 G/DL (ref 31–36)
MCV RBC AUTO: 89.9 FL (ref 80–100)
MONOCYTES # BLD: 1 K/UL (ref 0–1.3)
MONOCYTES NFR BLD: 10.3 %
NEUTROPHILS # BLD: 4.8 K/UL (ref 1.7–7.7)
NEUTROPHILS NFR BLD: 49.5 %
PHOSPHATE SERPL-MCNC: 3.6 MG/DL (ref 2.5–4.9)
PLATELET # BLD AUTO: 185 K/UL (ref 135–450)
PLATELET BLD QL SMEAR: ADEQUATE
PMV BLD AUTO: 7.8 FL (ref 5–10.5)
POTASSIUM SERPL-SCNC: 3.3 MMOL/L (ref 3.5–5.1)
POTASSIUM SERPL-SCNC: 4.4 MMOL/L (ref 3.5–5.1)
POTASSIUM SERPL-SCNC: ABNORMAL MMOL/L (ref 3.5–5.1)
RBC # BLD AUTO: 4.77 M/UL (ref 4–5.2)
REASON FOR REJECTION: NORMAL
REJECTED TEST: NORMAL
SLIDE REVIEW: NORMAL
SODIUM SERPL-SCNC: 137 MMOL/L (ref 136–145)
SODIUM SERPL-SCNC: 139 MMOL/L (ref 136–145)
TRIGL SERPL-MCNC: 42 MG/DL (ref 0–150)
TROPONIN, HIGH SENSITIVITY: 9 NG/L (ref 0–14)
VLDLC SERPL CALC-MCNC: 8 MG/DL
WBC # BLD AUTO: 9.6 K/UL (ref 4–11)

## 2023-05-18 PROCEDURE — 6360000004 HC RX CONTRAST MEDICATION: Performed by: EMERGENCY MEDICINE

## 2023-05-18 PROCEDURE — 6370000000 HC RX 637 (ALT 250 FOR IP): Performed by: INTERNAL MEDICINE

## 2023-05-18 PROCEDURE — 70498 CT ANGIOGRAPHY NECK: CPT

## 2023-05-18 PROCEDURE — 6360000002 HC RX W HCPCS: Performed by: INTERNAL MEDICINE

## 2023-05-18 PROCEDURE — 70551 MRI BRAIN STEM W/O DYE: CPT

## 2023-05-18 PROCEDURE — 84484 ASSAY OF TROPONIN QUANT: CPT

## 2023-05-18 PROCEDURE — G0378 HOSPITAL OBSERVATION PER HR: HCPCS

## 2023-05-18 PROCEDURE — 99285 EMERGENCY DEPT VISIT HI MDM: CPT

## 2023-05-18 PROCEDURE — 97535 SELF CARE MNGMENT TRAINING: CPT

## 2023-05-18 PROCEDURE — 36415 COLL VENOUS BLD VENIPUNCTURE: CPT

## 2023-05-18 PROCEDURE — 97530 THERAPEUTIC ACTIVITIES: CPT

## 2023-05-18 PROCEDURE — 85025 COMPLETE CBC W/AUTO DIFF WBC: CPT

## 2023-05-18 PROCEDURE — 84132 ASSAY OF SERUM POTASSIUM: CPT

## 2023-05-18 PROCEDURE — 6370000000 HC RX 637 (ALT 250 FOR IP): Performed by: EMERGENCY MEDICINE

## 2023-05-18 PROCEDURE — 97162 PT EVAL MOD COMPLEX 30 MIN: CPT

## 2023-05-18 PROCEDURE — 70450 CT HEAD/BRAIN W/O DYE: CPT

## 2023-05-18 PROCEDURE — 80061 LIPID PANEL: CPT

## 2023-05-18 PROCEDURE — 80069 RENAL FUNCTION PANEL: CPT

## 2023-05-18 PROCEDURE — 83735 ASSAY OF MAGNESIUM: CPT

## 2023-05-18 PROCEDURE — 93005 ELECTROCARDIOGRAM TRACING: CPT | Performed by: EMERGENCY MEDICINE

## 2023-05-18 PROCEDURE — 97166 OT EVAL MOD COMPLEX 45 MIN: CPT

## 2023-05-18 RX ORDER — ASPIRIN 81 MG/1
81 TABLET ORAL DAILY
Status: DISCONTINUED | OUTPATIENT
Start: 2023-05-19 | End: 2023-05-18

## 2023-05-18 RX ORDER — METHOCARBAMOL 500 MG/1
750 TABLET, FILM COATED ORAL 4 TIMES DAILY
Status: DISCONTINUED | OUTPATIENT
Start: 2023-05-18 | End: 2023-05-18

## 2023-05-18 RX ORDER — POLYETHYLENE GLYCOL 3350 17 G/17G
17 POWDER, FOR SOLUTION ORAL DAILY PRN
Status: DISCONTINUED | OUTPATIENT
Start: 2023-05-18 | End: 2023-05-21 | Stop reason: HOSPADM

## 2023-05-18 RX ORDER — CHOLECALCIFEROL (VITAMIN D3) 125 MCG
1000 CAPSULE ORAL DAILY
Status: DISCONTINUED | OUTPATIENT
Start: 2023-05-18 | End: 2023-05-18

## 2023-05-18 RX ORDER — SODIUM CHLORIDE 9 MG/ML
INJECTION, SOLUTION INTRAVENOUS CONTINUOUS
Status: DISCONTINUED | OUTPATIENT
Start: 2023-05-18 | End: 2023-05-18

## 2023-05-18 RX ORDER — MECLIZINE HCL 12.5 MG/1
25 TABLET ORAL 3 TIMES DAILY
Status: DISCONTINUED | OUTPATIENT
Start: 2023-05-18 | End: 2023-05-21

## 2023-05-18 RX ORDER — ONDANSETRON 2 MG/ML
4 INJECTION INTRAMUSCULAR; INTRAVENOUS EVERY 6 HOURS PRN
Status: DISCONTINUED | OUTPATIENT
Start: 2023-05-18 | End: 2023-05-21 | Stop reason: HOSPADM

## 2023-05-18 RX ORDER — ANTIOX #8/OM3/DHA/EPA/LUT/ZEAX 250-2.5 MG
1 CAPSULE ORAL 2 TIMES DAILY
COMMUNITY

## 2023-05-18 RX ORDER — ONDANSETRON 4 MG/1
4 TABLET, ORALLY DISINTEGRATING ORAL EVERY 8 HOURS PRN
Status: DISCONTINUED | OUTPATIENT
Start: 2023-05-18 | End: 2023-05-21 | Stop reason: HOSPADM

## 2023-05-18 RX ORDER — MECLIZINE HCL 12.5 MG/1
25 TABLET ORAL ONCE
Status: COMPLETED | OUTPATIENT
Start: 2023-05-18 | End: 2023-05-18

## 2023-05-18 RX ORDER — TRAZODONE HYDROCHLORIDE 50 MG/1
50 TABLET ORAL NIGHTLY
Status: DISCONTINUED | OUTPATIENT
Start: 2023-05-18 | End: 2023-05-21 | Stop reason: HOSPADM

## 2023-05-18 RX ORDER — M-VIT,TX,IRON,MINS/CALC/FOLIC 27MG-0.4MG
1 TABLET ORAL DAILY
Status: DISCONTINUED | OUTPATIENT
Start: 2023-05-18 | End: 2023-05-21 | Stop reason: HOSPADM

## 2023-05-18 RX ORDER — MULTIVITAMIN WITH IRON
1 TABLET ORAL 2 TIMES DAILY
Status: DISCONTINUED | OUTPATIENT
Start: 2023-05-18 | End: 2023-05-18

## 2023-05-18 RX ORDER — ASPIRIN 300 MG/1
300 SUPPOSITORY RECTAL DAILY
Status: DISCONTINUED | OUTPATIENT
Start: 2023-05-19 | End: 2023-05-18

## 2023-05-18 RX ORDER — ANTIOX #8/OM3/DHA/EPA/LUT/ZEAX 250-2.5 MG
1 CAPSULE ORAL 2 TIMES DAILY WITH MEALS
Status: DISCONTINUED | OUTPATIENT
Start: 2023-05-18 | End: 2023-05-21 | Stop reason: HOSPADM

## 2023-05-18 RX ORDER — MAGNESIUM SULFATE 1 G/100ML
1000 INJECTION INTRAVENOUS PRN
Status: DISCONTINUED | OUTPATIENT
Start: 2023-05-18 | End: 2023-05-21 | Stop reason: HOSPADM

## 2023-05-18 RX ORDER — CELECOXIB 200 MG/1
200 CAPSULE ORAL 2 TIMES DAILY
Status: DISCONTINUED | OUTPATIENT
Start: 2023-05-18 | End: 2023-05-18

## 2023-05-18 RX ORDER — AMLODIPINE BESYLATE 5 MG/1
5 TABLET ORAL DAILY
Status: DISCONTINUED | OUTPATIENT
Start: 2023-05-18 | End: 2023-05-18

## 2023-05-18 RX ORDER — LISINOPRIL 20 MG/1
20 TABLET ORAL DAILY
Qty: 30 TABLET | Refills: 5 | Status: ON HOLD | COMMUNITY
Start: 2023-05-08 | End: 2023-05-21 | Stop reason: SDUPTHER

## 2023-05-18 RX ORDER — LISINOPRIL 20 MG/1
20 TABLET ORAL DAILY
Status: DISCONTINUED | OUTPATIENT
Start: 2023-05-19 | End: 2023-05-21

## 2023-05-18 RX ORDER — ENOXAPARIN SODIUM 100 MG/ML
40 INJECTION SUBCUTANEOUS DAILY
Status: DISCONTINUED | OUTPATIENT
Start: 2023-05-18 | End: 2023-05-21 | Stop reason: HOSPADM

## 2023-05-18 RX ORDER — IBUPROFEN 200 MG
400 CAPSULE ORAL 2 TIMES DAILY
Status: DISCONTINUED | OUTPATIENT
Start: 2023-05-18 | End: 2023-05-18

## 2023-05-18 RX ORDER — ASPIRIN 81 MG/1
324 TABLET, CHEWABLE ORAL ONCE
Status: COMPLETED | OUTPATIENT
Start: 2023-05-18 | End: 2023-05-18

## 2023-05-18 RX ORDER — POTASSIUM CHLORIDE 20 MEQ/1
40 TABLET, EXTENDED RELEASE ORAL ONCE
Status: COMPLETED | OUTPATIENT
Start: 2023-05-18 | End: 2023-05-18

## 2023-05-18 RX ORDER — OXYBUTYNIN CHLORIDE 5 MG/1
5 TABLET ORAL 2 TIMES DAILY
Status: DISCONTINUED | OUTPATIENT
Start: 2023-05-18 | End: 2023-05-21 | Stop reason: HOSPADM

## 2023-05-18 RX ORDER — HYDRALAZINE HYDROCHLORIDE 20 MG/ML
10 INJECTION INTRAMUSCULAR; INTRAVENOUS EVERY 6 HOURS PRN
Status: DISCONTINUED | OUTPATIENT
Start: 2023-05-18 | End: 2023-05-21 | Stop reason: HOSPADM

## 2023-05-18 RX ORDER — POTASSIUM CHLORIDE 20 MEQ/1
40 TABLET, EXTENDED RELEASE ORAL PRN
Status: DISCONTINUED | OUTPATIENT
Start: 2023-05-18 | End: 2023-05-19

## 2023-05-18 RX ORDER — VITAMIN B COMPLEX
2000 TABLET ORAL DAILY
Status: DISCONTINUED | OUTPATIENT
Start: 2023-05-18 | End: 2023-05-21 | Stop reason: HOSPADM

## 2023-05-18 RX ORDER — MAGNESIUM SULFATE IN WATER 40 MG/ML
4000 INJECTION, SOLUTION INTRAVENOUS ONCE
Status: COMPLETED | OUTPATIENT
Start: 2023-05-18 | End: 2023-05-18

## 2023-05-18 RX ORDER — POTASSIUM CHLORIDE 7.45 MG/ML
10 INJECTION INTRAVENOUS PRN
Status: DISCONTINUED | OUTPATIENT
Start: 2023-05-18 | End: 2023-05-19

## 2023-05-18 RX ORDER — GABAPENTIN 300 MG/1
300 CAPSULE ORAL EVERY EVENING
Status: DISCONTINUED | OUTPATIENT
Start: 2023-05-18 | End: 2023-05-18

## 2023-05-18 RX ORDER — OXYCODONE HYDROCHLORIDE AND ACETAMINOPHEN 5; 325 MG/1; MG/1
1 TABLET ORAL 2 TIMES DAILY
Status: DISCONTINUED | OUTPATIENT
Start: 2023-05-18 | End: 2023-05-21 | Stop reason: HOSPADM

## 2023-05-18 RX ORDER — ROSUVASTATIN CALCIUM 40 MG/1
40 TABLET, COATED ORAL NIGHTLY
Status: DISCONTINUED | OUTPATIENT
Start: 2023-05-18 | End: 2023-05-19

## 2023-05-18 RX ORDER — ACETAMINOPHEN 325 MG/1
650 TABLET ORAL EVERY 4 HOURS PRN
Status: DISCONTINUED | OUTPATIENT
Start: 2023-05-18 | End: 2023-05-21 | Stop reason: HOSPADM

## 2023-05-18 RX ADMIN — POTASSIUM CHLORIDE 40 MEQ: 1500 TABLET, EXTENDED RELEASE ORAL at 09:32

## 2023-05-18 RX ADMIN — OXYCODONE AND ACETAMINOPHEN 1 TABLET: 5; 325 TABLET ORAL at 20:47

## 2023-05-18 RX ADMIN — ASPIRIN 81 MG 324 MG: 81 TABLET ORAL at 05:30

## 2023-05-18 RX ADMIN — AMLODIPINE BESYLATE 5 MG: 5 TABLET ORAL at 09:32

## 2023-05-18 RX ADMIN — MECLIZINE 25 MG: 12.5 TABLET ORAL at 15:09

## 2023-05-18 RX ADMIN — MECLIZINE 25 MG: 12.5 TABLET ORAL at 05:29

## 2023-05-18 RX ADMIN — MECLIZINE 25 MG: 12.5 TABLET ORAL at 20:46

## 2023-05-18 RX ADMIN — OXYCODONE AND ACETAMINOPHEN 1 TABLET: 5; 325 TABLET ORAL at 12:16

## 2023-05-18 RX ADMIN — Medication 1 CAPSULE: at 23:29

## 2023-05-18 RX ADMIN — IOPAMIDOL 75 ML: 755 INJECTION, SOLUTION INTRAVENOUS at 04:00

## 2023-05-18 RX ADMIN — ONDANSETRON 4 MG: 2 INJECTION INTRAMUSCULAR; INTRAVENOUS at 07:28

## 2023-05-18 RX ADMIN — Medication 2000 UNITS: at 12:16

## 2023-05-18 RX ADMIN — TRAZODONE HYDROCHLORIDE 50 MG: 50 TABLET ORAL at 23:29

## 2023-05-18 RX ADMIN — MAGNESIUM SULFATE HEPTAHYDRATE 4000 MG: 40 INJECTION, SOLUTION INTRAVENOUS at 09:25

## 2023-05-18 RX ADMIN — OXYBUTYNIN CHLORIDE 5 MG: 5 TABLET ORAL at 12:16

## 2023-05-18 RX ADMIN — OXYBUTYNIN CHLORIDE 5 MG: 5 TABLET ORAL at 20:47

## 2023-05-18 RX ADMIN — MECLIZINE 25 MG: 12.5 TABLET ORAL at 09:32

## 2023-05-18 RX ADMIN — ENOXAPARIN SODIUM 40 MG: 100 INJECTION SUBCUTANEOUS at 09:35

## 2023-05-18 RX ADMIN — Medication 1 TABLET: at 09:33

## 2023-05-18 ASSESSMENT — PAIN - FUNCTIONAL ASSESSMENT
PAIN_FUNCTIONAL_ASSESSMENT: NONE - DENIES PAIN
PAIN_FUNCTIONAL_ASSESSMENT: ACTIVITIES ARE NOT PREVENTED
PAIN_FUNCTIONAL_ASSESSMENT: ACTIVITIES ARE NOT PREVENTED

## 2023-05-18 ASSESSMENT — PAIN SCALES - GENERAL
PAINLEVEL_OUTOF10: 0
PAINLEVEL_OUTOF10: 5
PAINLEVEL_OUTOF10: 4
PAINLEVEL_OUTOF10: 5

## 2023-05-18 ASSESSMENT — LIFESTYLE VARIABLES
HOW MANY STANDARD DRINKS CONTAINING ALCOHOL DO YOU HAVE ON A TYPICAL DAY: PATIENT DOES NOT DRINK
HOW OFTEN DO YOU HAVE A DRINK CONTAINING ALCOHOL: NEVER
HOW OFTEN DO YOU HAVE A DRINK CONTAINING ALCOHOL: NEVER

## 2023-05-18 ASSESSMENT — PAIN DESCRIPTION - FREQUENCY
FREQUENCY: INTERMITTENT
FREQUENCY: CONTINUOUS

## 2023-05-18 ASSESSMENT — ENCOUNTER SYMPTOMS
COLOR CHANGE: 0
COUGH: 0
VOMITING: 0
EYE ITCHING: 0
SHORTNESS OF BREATH: 0
CONSTIPATION: 0
ABDOMINAL PAIN: 0
EYE DISCHARGE: 0

## 2023-05-18 ASSESSMENT — PAIN DESCRIPTION - PAIN TYPE
TYPE: ACUTE PAIN
TYPE: ACUTE PAIN

## 2023-05-18 ASSESSMENT — PAIN DESCRIPTION - ONSET
ONSET: GRADUAL
ONSET: GRADUAL

## 2023-05-18 ASSESSMENT — PAIN SCALES - WONG BAKER: WONGBAKER_NUMERICALRESPONSE: 0

## 2023-05-18 ASSESSMENT — PAIN DESCRIPTION - DESCRIPTORS
DESCRIPTORS: ACHING
DESCRIPTORS: ACHING

## 2023-05-18 ASSESSMENT — PAIN DESCRIPTION - LOCATION
LOCATION: HEAD
LOCATION: HEAD

## 2023-05-18 ASSESSMENT — PAIN DESCRIPTION - ORIENTATION
ORIENTATION: ANTERIOR
ORIENTATION: MID

## 2023-05-19 LAB
ALBUMIN SERPL-MCNC: 3.3 G/DL (ref 3.4–5)
ALBUMIN/GLOB SERPL: 1.2 {RATIO} (ref 1.1–2.2)
ALP SERPL-CCNC: 83 U/L (ref 40–129)
ALT SERPL-CCNC: 9 U/L (ref 10–40)
ANION GAP SERPL CALCULATED.3IONS-SCNC: 8 MMOL/L (ref 3–16)
AST SERPL-CCNC: 18 U/L (ref 15–37)
BASOPHILS # BLD: 0.1 K/UL (ref 0–0.2)
BASOPHILS NFR BLD: 1 %
BILIRUB SERPL-MCNC: 0.3 MG/DL (ref 0–1)
BUN SERPL-MCNC: 14 MG/DL (ref 7–20)
CALCIUM SERPL-MCNC: 8 MG/DL (ref 8.3–10.6)
CHLORIDE SERPL-SCNC: 103 MMOL/L (ref 99–110)
CO2 SERPL-SCNC: 26 MMOL/L (ref 21–32)
CREAT SERPL-MCNC: 1 MG/DL (ref 0.6–1.2)
DEPRECATED RDW RBC AUTO: 14.8 % (ref 12.4–15.4)
EKG ATRIAL RATE: 62 BPM
EKG DIAGNOSIS: NORMAL
EKG P AXIS: 50 DEGREES
EKG P-R INTERVAL: 186 MS
EKG Q-T INTERVAL: 448 MS
EKG QRS DURATION: 88 MS
EKG QTC CALCULATION (BAZETT): 454 MS
EKG R AXIS: 6 DEGREES
EKG T AXIS: -2 DEGREES
EKG VENTRICULAR RATE: 62 BPM
EOSINOPHIL # BLD: 0.1 K/UL (ref 0–0.6)
EOSINOPHIL NFR BLD: 1.6 %
EST. AVERAGE GLUCOSE BLD GHB EST-MCNC: 105.4 MG/DL
GFR SERPLBLD CREATININE-BSD FMLA CKD-EPI: 58 ML/MIN/{1.73_M2}
GLUCOSE SERPL-MCNC: 93 MG/DL (ref 70–99)
HBA1C MFR BLD: 5.3 %
HCT VFR BLD AUTO: 40.5 % (ref 36–48)
HGB BLD-MCNC: 13.2 G/DL (ref 12–16)
LYMPHOCYTES # BLD: 1.5 K/UL (ref 1–5.1)
LYMPHOCYTES NFR BLD: 19.9 %
MAGNESIUM SERPL-MCNC: 2.8 MG/DL (ref 1.8–2.4)
MCH RBC QN AUTO: 30 PG (ref 26–34)
MCHC RBC AUTO-ENTMCNC: 32.5 G/DL (ref 31–36)
MCV RBC AUTO: 92.4 FL (ref 80–100)
MONOCYTES # BLD: 1 K/UL (ref 0–1.3)
MONOCYTES NFR BLD: 13 %
NEUTROPHILS # BLD: 4.9 K/UL (ref 1.7–7.7)
NEUTROPHILS NFR BLD: 64.5 %
PHOSPHATE SERPL-MCNC: 4.4 MG/DL (ref 2.5–4.9)
PLATELET # BLD AUTO: 289 K/UL (ref 135–450)
PMV BLD AUTO: 7.5 FL (ref 5–10.5)
POTASSIUM SERPL-SCNC: 4.3 MMOL/L (ref 3.5–5.1)
PROT SERPL-MCNC: 6 G/DL (ref 6.4–8.2)
RBC # BLD AUTO: 4.38 M/UL (ref 4–5.2)
SODIUM SERPL-SCNC: 137 MMOL/L (ref 136–145)
WBC # BLD AUTO: 7.6 K/UL (ref 4–11)

## 2023-05-19 PROCEDURE — G0378 HOSPITAL OBSERVATION PER HR: HCPCS

## 2023-05-19 PROCEDURE — 9990000010 HC NO CHARGE VISIT

## 2023-05-19 PROCEDURE — 94760 N-INVAS EAR/PLS OXIMETRY 1: CPT

## 2023-05-19 PROCEDURE — 6370000000 HC RX 637 (ALT 250 FOR IP): Performed by: INTERNAL MEDICINE

## 2023-05-19 PROCEDURE — 80053 COMPREHEN METABOLIC PANEL: CPT

## 2023-05-19 PROCEDURE — 97530 THERAPEUTIC ACTIVITIES: CPT

## 2023-05-19 PROCEDURE — 83036 HEMOGLOBIN GLYCOSYLATED A1C: CPT

## 2023-05-19 PROCEDURE — 6360000002 HC RX W HCPCS: Performed by: INTERNAL MEDICINE

## 2023-05-19 PROCEDURE — 36415 COLL VENOUS BLD VENIPUNCTURE: CPT

## 2023-05-19 PROCEDURE — 85025 COMPLETE CBC W/AUTO DIFF WBC: CPT

## 2023-05-19 PROCEDURE — 84100 ASSAY OF PHOSPHORUS: CPT

## 2023-05-19 PROCEDURE — 93010 ELECTROCARDIOGRAM REPORT: CPT | Performed by: INTERNAL MEDICINE

## 2023-05-19 PROCEDURE — 83735 ASSAY OF MAGNESIUM: CPT

## 2023-05-19 RX ORDER — MINERAL OIL AND WHITE PETROLATUM 150; 830 MG/G; MG/G
OINTMENT OPHTHALMIC EVERY 4 HOURS PRN
Status: DISCONTINUED | OUTPATIENT
Start: 2023-05-19 | End: 2023-05-21 | Stop reason: HOSPADM

## 2023-05-19 RX ORDER — METHOCARBAMOL 500 MG/1
250 TABLET, FILM COATED ORAL 4 TIMES DAILY
Status: COMPLETED | OUTPATIENT
Start: 2023-05-19 | End: 2023-05-19

## 2023-05-19 RX ORDER — DIAZEPAM 5 MG/1
5 TABLET ORAL ONCE
Status: DISCONTINUED | OUTPATIENT
Start: 2023-05-19 | End: 2023-05-20

## 2023-05-19 RX ORDER — MECLIZINE HYDROCHLORIDE 25 MG/1
25 TABLET ORAL 3 TIMES DAILY PRN
Qty: 30 TABLET | Refills: 0 | Status: SHIPPED | OUTPATIENT
Start: 2023-05-19 | End: 2023-05-29

## 2023-05-19 RX ADMIN — WHITE PETROLATUM 57.7 %-MINERAL OIL 31.9 % EYE OINTMENT: at 12:25

## 2023-05-19 RX ADMIN — Medication 1 CAPSULE: at 18:42

## 2023-05-19 RX ADMIN — MECLIZINE 25 MG: 12.5 TABLET ORAL at 21:29

## 2023-05-19 RX ADMIN — OXYBUTYNIN CHLORIDE 5 MG: 5 TABLET ORAL at 09:43

## 2023-05-19 RX ADMIN — METHOCARBAMOL 250 MG: 500 TABLET ORAL at 18:41

## 2023-05-19 RX ADMIN — OXYCODONE AND ACETAMINOPHEN 1 TABLET: 5; 325 TABLET ORAL at 09:43

## 2023-05-19 RX ADMIN — Medication 2000 UNITS: at 09:43

## 2023-05-19 RX ADMIN — ENOXAPARIN SODIUM 40 MG: 100 INJECTION SUBCUTANEOUS at 09:42

## 2023-05-19 RX ADMIN — Medication 1 CAPSULE: at 09:42

## 2023-05-19 RX ADMIN — OXYCODONE AND ACETAMINOPHEN 1 TABLET: 5; 325 TABLET ORAL at 21:29

## 2023-05-19 RX ADMIN — OXYBUTYNIN CHLORIDE 5 MG: 5 TABLET ORAL at 21:29

## 2023-05-19 RX ADMIN — LISINOPRIL 20 MG: 20 TABLET ORAL at 09:45

## 2023-05-19 RX ADMIN — METHOCARBAMOL 250 MG: 500 TABLET ORAL at 21:29

## 2023-05-19 RX ADMIN — MECLIZINE 25 MG: 12.5 TABLET ORAL at 09:42

## 2023-05-19 RX ADMIN — Medication 1 TABLET: at 09:43

## 2023-05-19 RX ADMIN — METHOCARBAMOL 250 MG: 500 TABLET ORAL at 12:22

## 2023-05-19 RX ADMIN — MECLIZINE 25 MG: 12.5 TABLET ORAL at 15:57

## 2023-05-19 RX ADMIN — TRAZODONE HYDROCHLORIDE 50 MG: 50 TABLET ORAL at 21:29

## 2023-05-19 ASSESSMENT — PAIN DESCRIPTION - DESCRIPTORS
DESCRIPTORS: ACHING
DESCRIPTORS: ACHING
DESCRIPTORS: ACHING;DISCOMFORT
DESCRIPTORS: ACHING

## 2023-05-19 ASSESSMENT — PAIN SCALES - GENERAL
PAINLEVEL_OUTOF10: 6
PAINLEVEL_OUTOF10: 4
PAINLEVEL_OUTOF10: 0
PAINLEVEL_OUTOF10: 3

## 2023-05-19 ASSESSMENT — PAIN SCALES - WONG BAKER
WONGBAKER_NUMERICALRESPONSE: 0
WONGBAKER_NUMERICALRESPONSE: 0

## 2023-05-19 ASSESSMENT — PAIN - FUNCTIONAL ASSESSMENT
PAIN_FUNCTIONAL_ASSESSMENT: PREVENTS OR INTERFERES SOME ACTIVE ACTIVITIES AND ADLS
PAIN_FUNCTIONAL_ASSESSMENT: ACTIVITIES ARE NOT PREVENTED
PAIN_FUNCTIONAL_ASSESSMENT: PREVENTS OR INTERFERES SOME ACTIVE ACTIVITIES AND ADLS

## 2023-05-19 ASSESSMENT — PAIN DESCRIPTION - ORIENTATION
ORIENTATION: LEFT
ORIENTATION: LEFT
ORIENTATION: LOWER
ORIENTATION: LEFT

## 2023-05-19 ASSESSMENT — PAIN DESCRIPTION - LOCATION
LOCATION: SHOULDER
LOCATION: SHOULDER
LOCATION: BACK
LOCATION: SHOULDER

## 2023-05-20 PROBLEM — R42 VERTIGO: Status: ACTIVE | Noted: 2023-05-20

## 2023-05-20 PROCEDURE — 6370000000 HC RX 637 (ALT 250 FOR IP): Performed by: INTERNAL MEDICINE

## 2023-05-20 PROCEDURE — 97116 GAIT TRAINING THERAPY: CPT

## 2023-05-20 PROCEDURE — 97110 THERAPEUTIC EXERCISES: CPT

## 2023-05-20 PROCEDURE — 97530 THERAPEUTIC ACTIVITIES: CPT

## 2023-05-20 PROCEDURE — 6360000002 HC RX W HCPCS: Performed by: INTERNAL MEDICINE

## 2023-05-20 PROCEDURE — 94760 N-INVAS EAR/PLS OXIMETRY 1: CPT

## 2023-05-20 PROCEDURE — 2580000003 HC RX 258: Performed by: INTERNAL MEDICINE

## 2023-05-20 PROCEDURE — 1200000000 HC SEMI PRIVATE

## 2023-05-20 RX ORDER — 0.9 % SODIUM CHLORIDE 0.9 %
500 INTRAVENOUS SOLUTION INTRAVENOUS ONCE
Status: COMPLETED | OUTPATIENT
Start: 2023-05-20 | End: 2023-05-20

## 2023-05-20 RX ORDER — METHOCARBAMOL 750 MG/1
750 TABLET, FILM COATED ORAL 4 TIMES DAILY PRN
Status: DISCONTINUED | OUTPATIENT
Start: 2023-05-20 | End: 2023-05-21 | Stop reason: HOSPADM

## 2023-05-20 RX ORDER — DIAZEPAM 5 MG/1
5 TABLET ORAL ONCE
Status: COMPLETED | OUTPATIENT
Start: 2023-05-20 | End: 2023-05-20

## 2023-05-20 RX ADMIN — OXYBUTYNIN CHLORIDE 5 MG: 5 TABLET ORAL at 21:01

## 2023-05-20 RX ADMIN — Medication 1 CAPSULE: at 17:51

## 2023-05-20 RX ADMIN — OXYCODONE AND ACETAMINOPHEN 1 TABLET: 5; 325 TABLET ORAL at 21:01

## 2023-05-20 RX ADMIN — SODIUM CHLORIDE 500 ML: 9 INJECTION, SOLUTION INTRAVENOUS at 13:23

## 2023-05-20 RX ADMIN — MECLIZINE 25 MG: 12.5 TABLET ORAL at 16:20

## 2023-05-20 RX ADMIN — ENOXAPARIN SODIUM 40 MG: 100 INJECTION SUBCUTANEOUS at 09:50

## 2023-05-20 RX ADMIN — MECLIZINE 25 MG: 12.5 TABLET ORAL at 09:50

## 2023-05-20 RX ADMIN — OXYCODONE AND ACETAMINOPHEN 1 TABLET: 5; 325 TABLET ORAL at 09:50

## 2023-05-20 RX ADMIN — Medication 1 CAPSULE: at 09:51

## 2023-05-20 RX ADMIN — OXYBUTYNIN CHLORIDE 5 MG: 5 TABLET ORAL at 09:50

## 2023-05-20 RX ADMIN — TRAZODONE HYDROCHLORIDE 50 MG: 50 TABLET ORAL at 23:28

## 2023-05-20 RX ADMIN — Medication 1 TABLET: at 09:50

## 2023-05-20 RX ADMIN — MECLIZINE 25 MG: 12.5 TABLET ORAL at 21:01

## 2023-05-20 RX ADMIN — DIAZEPAM 5 MG: 5 TABLET ORAL at 13:23

## 2023-05-20 RX ADMIN — Medication 2000 UNITS: at 10:02

## 2023-05-20 ASSESSMENT — PAIN DESCRIPTION - LOCATION
LOCATION: SHOULDER
LOCATION: SHOULDER

## 2023-05-20 ASSESSMENT — PAIN DESCRIPTION - DESCRIPTORS
DESCRIPTORS: ACHING
DESCRIPTORS: ACHING

## 2023-05-20 ASSESSMENT — PAIN SCALES - GENERAL
PAINLEVEL_OUTOF10: 3
PAINLEVEL_OUTOF10: 4
PAINLEVEL_OUTOF10: 2
PAINLEVEL_OUTOF10: 2
PAINLEVEL_OUTOF10: 5
PAINLEVEL_OUTOF10: 5

## 2023-05-20 ASSESSMENT — PAIN - FUNCTIONAL ASSESSMENT: PAIN_FUNCTIONAL_ASSESSMENT: ACTIVITIES ARE NOT PREVENTED

## 2023-05-20 ASSESSMENT — PAIN DESCRIPTION - PAIN TYPE: TYPE: ACUTE PAIN

## 2023-05-20 ASSESSMENT — PAIN DESCRIPTION - ORIENTATION
ORIENTATION: LEFT
ORIENTATION: LEFT

## 2023-05-20 ASSESSMENT — PAIN DESCRIPTION - FREQUENCY: FREQUENCY: CONTINUOUS

## 2023-05-20 ASSESSMENT — PAIN DESCRIPTION - ONSET: ONSET: ON-GOING

## 2023-05-21 VITALS
WEIGHT: 165.34 LBS | HEART RATE: 48 BPM | HEIGHT: 58 IN | OXYGEN SATURATION: 95 % | TEMPERATURE: 97.7 F | SYSTOLIC BLOOD PRESSURE: 93 MMHG | RESPIRATION RATE: 16 BRPM | BODY MASS INDEX: 34.71 KG/M2 | DIASTOLIC BLOOD PRESSURE: 61 MMHG

## 2023-05-21 PROCEDURE — 6370000000 HC RX 637 (ALT 250 FOR IP): Performed by: INTERNAL MEDICINE

## 2023-05-21 PROCEDURE — 94760 N-INVAS EAR/PLS OXIMETRY 1: CPT

## 2023-05-21 PROCEDURE — 97116 GAIT TRAINING THERAPY: CPT

## 2023-05-21 PROCEDURE — 6360000002 HC RX W HCPCS: Performed by: INTERNAL MEDICINE

## 2023-05-21 PROCEDURE — 97530 THERAPEUTIC ACTIVITIES: CPT

## 2023-05-21 RX ORDER — MECLIZINE HCL 12.5 MG/1
25 TABLET ORAL 3 TIMES DAILY PRN
Status: DISCONTINUED | OUTPATIENT
Start: 2023-05-21 | End: 2023-05-21 | Stop reason: HOSPADM

## 2023-05-21 RX ORDER — LISINOPRIL 20 MG/1
10 TABLET ORAL DAILY
Qty: 15 TABLET | Refills: 0
Start: 2023-05-21

## 2023-05-21 RX ORDER — LISINOPRIL 10 MG/1
10 TABLET ORAL DAILY
Status: DISCONTINUED | OUTPATIENT
Start: 2023-05-22 | End: 2023-05-21 | Stop reason: HOSPADM

## 2023-05-21 RX ADMIN — MECLIZINE 25 MG: 12.5 TABLET ORAL at 14:32

## 2023-05-21 RX ADMIN — Medication 1 TABLET: at 09:33

## 2023-05-21 RX ADMIN — Medication 2000 UNITS: at 09:32

## 2023-05-21 RX ADMIN — Medication 1 CAPSULE: at 09:32

## 2023-05-21 RX ADMIN — OXYBUTYNIN CHLORIDE 5 MG: 5 TABLET ORAL at 09:32

## 2023-05-21 RX ADMIN — ENOXAPARIN SODIUM 40 MG: 100 INJECTION SUBCUTANEOUS at 09:33

## 2023-05-21 RX ADMIN — OXYCODONE AND ACETAMINOPHEN 1 TABLET: 5; 325 TABLET ORAL at 09:32

## 2023-05-21 RX ADMIN — LISINOPRIL 20 MG: 20 TABLET ORAL at 09:32

## 2023-05-21 ASSESSMENT — PAIN - FUNCTIONAL ASSESSMENT: PAIN_FUNCTIONAL_ASSESSMENT: ACTIVITIES ARE NOT PREVENTED

## 2023-05-21 ASSESSMENT — PAIN DESCRIPTION - LOCATION: LOCATION: SHOULDER

## 2023-05-21 ASSESSMENT — PAIN DESCRIPTION - ONSET: ONSET: ON-GOING

## 2023-05-21 ASSESSMENT — PAIN DESCRIPTION - DESCRIPTORS: DESCRIPTORS: ACHING

## 2023-05-21 ASSESSMENT — PAIN SCALES - GENERAL
PAINLEVEL_OUTOF10: 2
PAINLEVEL_OUTOF10: 4
PAINLEVEL_OUTOF10: 4

## 2023-05-21 ASSESSMENT — PAIN DESCRIPTION - FREQUENCY: FREQUENCY: CONTINUOUS

## 2023-05-21 ASSESSMENT — PAIN DESCRIPTION - PAIN TYPE: TYPE: ACUTE PAIN

## 2023-05-21 ASSESSMENT — PAIN DESCRIPTION - ORIENTATION: ORIENTATION: LEFT

## 2023-10-28 ENCOUNTER — APPOINTMENT (OUTPATIENT)
Dept: CT IMAGING | Age: 78
End: 2023-10-28
Payer: MEDICARE

## 2023-10-28 ENCOUNTER — HOSPITAL ENCOUNTER (EMERGENCY)
Age: 78
Discharge: HOME | End: 2023-10-28
Attending: EMERGENCY MEDICINE
Payer: MEDICARE

## 2023-10-28 ENCOUNTER — APPOINTMENT (OUTPATIENT)
Dept: GENERAL RADIOLOGY | Age: 78
End: 2023-10-28
Payer: MEDICARE

## 2023-10-28 VITALS
SYSTOLIC BLOOD PRESSURE: 145 MMHG | TEMPERATURE: 98.2 F | WEIGHT: 163.14 LBS | DIASTOLIC BLOOD PRESSURE: 82 MMHG | OXYGEN SATURATION: 95 % | BODY MASS INDEX: 34.1 KG/M2 | RESPIRATION RATE: 16 BRPM | HEART RATE: 77 BPM
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
## 2023-10-28 DIAGNOSIS — M25.562 LEFT KNEE PAIN, UNSPECIFIED CHRONICITY: ICD-10-CM

## 2023-10-28 DIAGNOSIS — N63.0 BREAST NODULE: ICD-10-CM

## 2023-10-28 DIAGNOSIS — S42.017A CLOSED NONDISPLACED FRACTURE OF STERNAL END OF RIGHT CLAVICLE, INITIAL ENCOUNTER: ICD-10-CM

## 2023-10-28 DIAGNOSIS — M54.50 LEFT-SIDED LOW BACK PAIN WITHOUT SCIATICA, UNSPECIFIED CHRONICITY: ICD-10-CM

## 2023-10-28 PROCEDURE — 72125 CT NECK SPINE W/O DYE: CPT

## 2023-10-28 PROCEDURE — 73560 X-RAY EXAM OF KNEE 1 OR 2: CPT

## 2023-10-28 PROCEDURE — 6370000000 HC RX 637 (ALT 250 FOR IP): Performed by: EMERGENCY MEDICINE

## 2023-10-28 PROCEDURE — 72128 CT CHEST SPINE W/O DYE: CPT

## 2023-10-28 PROCEDURE — 71250 CT THORAX DX C-: CPT

## 2023-10-28 PROCEDURE — 99284 EMERGENCY DEPT VISIT MOD MDM: CPT

## 2023-10-28 PROCEDURE — 71045 X-RAY EXAM CHEST 1 VIEW: CPT

## 2023-10-28 PROCEDURE — 72131 CT LUMBAR SPINE W/O DYE: CPT

## 2023-10-28 PROCEDURE — 73502 X-RAY EXAM HIP UNI 2-3 VIEWS: CPT

## 2023-10-28 PROCEDURE — 73030 X-RAY EXAM OF SHOULDER: CPT

## 2023-10-28 RX ORDER — LIDOCAINE 4 G/G
1 PATCH TOPICAL DAILY
Status: DISCONTINUED | OUTPATIENT
Start: 2023-10-28 | End: 2023-10-28 | Stop reason: HOSPADM

## 2023-10-28 RX ORDER — LIDOCAINE 4 G/G
1 PATCH TOPICAL DAILY
Qty: 30 PATCH | Refills: 0 | Status: SHIPPED | OUTPATIENT
Start: 2023-10-28 | End: 2023-11-27

## 2023-10-28 RX ORDER — OXYCODONE HYDROCHLORIDE 5 MG/1
5 TABLET ORAL
Status: COMPLETED | OUTPATIENT
Start: 2023-10-28 | End: 2023-10-28

## 2023-10-28 RX ORDER — METHOCARBAMOL 500 MG/1
750 TABLET, FILM COATED ORAL ONCE
Status: COMPLETED | OUTPATIENT
Start: 2023-10-28 | End: 2023-10-28

## 2023-10-28 RX ADMIN — METHOCARBAMOL 750 MG: 500 TABLET ORAL at 13:18

## 2023-10-28 RX ADMIN — OXYCODONE HYDROCHLORIDE 5 MG: 5 TABLET ORAL at 07:49

## 2023-10-28 ASSESSMENT — PAIN DESCRIPTION - LOCATION
LOCATION: BACK
LOCATION_2: SHOULDER
LOCATION_3: KNEE
LOCATION: BACK
LOCATION: BACK
LOCATION_3: KNEE
LOCATION: BACK
LOCATION_2: SHOULDER

## 2023-10-28 ASSESSMENT — PAIN DESCRIPTION - DESCRIPTORS
DESCRIPTORS_2: ACHING
DESCRIPTORS: ACHING
DESCRIPTORS_3: ACHING
DESCRIPTORS: ACHING
DESCRIPTORS_3: ACHING
DESCRIPTORS_2: ACHING

## 2023-10-28 ASSESSMENT — PAIN DESCRIPTION - ORIENTATION
ORIENTATION: MID;LOWER
ORIENTATION: LOWER
ORIENTATION_2: RIGHT
ORIENTATION_3: LEFT
ORIENTATION_3: LEFT
ORIENTATION: LOWER
ORIENTATION_2: RIGHT

## 2023-10-28 ASSESSMENT — PAIN - FUNCTIONAL ASSESSMENT
PAIN_FUNCTIONAL_ASSESSMENT: PREVENTS OR INTERFERES SOME ACTIVE ACTIVITIES AND ADLS
PAIN_FUNCTIONAL_ASSESSMENT: 0-10
PAIN_FUNCTIONAL_ASSESSMENT: 0-10

## 2023-10-28 ASSESSMENT — PAIN SCALES - GENERAL
PAINLEVEL_OUTOF10: 9
PAINLEVEL_OUTOF10: 8
PAINLEVEL_OUTOF10: 7
PAINLEVEL_OUTOF10: 8

## 2023-10-28 ASSESSMENT — PAIN DESCRIPTION - INTENSITY
RATING_3: 5
RATING_2: 9
RATING_2: 0
RATING_3: 0

## 2023-10-28 ASSESSMENT — PATIENT HEALTH QUESTIONNAIRE - PHQ9
SUM OF ALL RESPONSES TO PHQ9 QUESTIONS 1 & 2: 0
SUM OF ALL RESPONSES TO PHQ QUESTIONS 1-9: 0
SUM OF ALL RESPONSES TO PHQ QUESTIONS 1-9: 0
1. LITTLE INTEREST OR PLEASURE IN DOING THINGS: 0
2. FEELING DOWN, DEPRESSED OR HOPELESS: 0
SUM OF ALL RESPONSES TO PHQ QUESTIONS 1-9: 0
SUM OF ALL RESPONSES TO PHQ QUESTIONS 1-9: 0

## 2023-10-28 ASSESSMENT — PAIN DESCRIPTION - PAIN TYPE: TYPE: ACUTE PAIN

## 2023-10-28 NOTE — ED NOTES
Pt discharged at this time. Discharge instructions and medications reviewed,  Questions were answered. PT verbalized understanding. VSS, Afebrile. Follow up appointments were discussed.          Jimi Arguello RN  10/28/23 7755

## 2023-10-28 NOTE — ED TRIAGE NOTES
Pt arrived from home via EMS c/o lower back pain, left knee pain, and right shoulder pain after a mechanical fall this morning about 0200. Pt was able to get off the floor and put ice on her leg and fell asleep on the couch for a few hours. Pt denies loss of consciousness, denies hitting her head, denies taking blood thinners. Pt states she can't lift her right arm at the shoulder. Pt has hx of bilateral knee replacements and a compression fracture in her back. Pt A&Ox4. Hypertensive at this time with history or same, other VS stable. Respirations even, easy, unlabored. Pt states she took her prescribed percocet after the fall this morning.

## 2023-10-28 NOTE — ED NOTES
Applied small sling to right arm as ordered. Pt tolerated well and verbalized understanding of the purpose and use of the device.       Mervat Almanza RN  10/28/23 1426

## 2023-10-28 NOTE — ED NOTES
Pt ambulated in the james from the exam room to the bathroom using her cane. Pt ambulated approximately 100 feet total. Nurse standby. Pt minimally used the hand rail on the wall. Pt right leg taking half the length of a step than the left. Gait is steady otherwise. Pt stated she has concerns with her house being two stories and that she lives alone. Provider updated.      Alicja Salcedo RN  10/28/23 4412

## 2023-10-28 NOTE — DISCHARGE INSTRUCTIONS
There was a small nodule seen in your right breast.  Have your doctor correlate this with your prior mammogram.     You have a fracture in your clavicle. Wear sling as needed. Follow-up with orthopedics. Use your pain medication, muscle relaxers and lidocaine patches as needed for pain. Your hardware in your knee may not be functioning as it should. Follow-up with orthopedics regarding this.

## 2023-10-28 NOTE — ED PROVIDER NOTES
[address] [facility]    CHIEF COMPLAINT  Fall (Mechanical fall at home this morning. Right shoulder pain worse with movement, unable to raise arm. Left knee pain. Lumbar back pain.), Shoulder Pain, Back Pain, and Leg Pain       HISTORY OF PRESENT ILLNESS  Shaheen Corona is a 66 y.o. female with past medical history of right shoulder surgery, osteoarthritis status post left total knee replacement, CAD who presents to the ED following a fall. She states that she tripped over her slipper and struck her right shoulder on the sink around 2 AM.  Denies head trauma, dizziness or loss of consciousness. She is not on anticoagulation. She got herself off the floor and iced her shoulder and her left knee and slept on the couch for couple hours but when she woke up she was having worse pain in her right shoulder, left knee and low back. She has a history of compression fractures and she was concerned so she presented for evaluation. She took one of her home oxycodone prior to presenting. Denies numbness or sensory deficit. Denies chest pain, abdominal pain, shortness of breath.     I have reviewed the following from the nursing documentation:    Past Medical History:   Diagnosis Date    Arthritis     Cancer [facility])     breast    Cataract     Hypertension     Hypoglycemia     Personal history of kidney stones     Tremor      Past Surgical History:   Procedure Laterality Date    APPENDECTOMY      BREAST SURGERY  2004    lumpectomy right     SECTION  1975    CHOLECYSTECTOMY      COLONOSCOPY      COLONOSCOPY N/A 2020    COLORECTAL CANCER SCREENING, NOT HIGH RISK performed by Sreedhar Beth MD at [address] [facility].    JOINT REPLACEMENT      left knee    JOINT REPLACEMENT      right knee    KIDNEY STONE SURGERY      percutaneous    KNEE ARTHROPLASTY  [id number]    RIGHT TOTAL KNEE ARTHROPLASTY      LITHOTRIPSY      ROTATOR CUFF REPAIR  ,  fall.  She did not have head trauma. No loss of consciousness. I do not feel head CT is indicated at this time. CT of her spine obtained which did not show any acute abnormality. She does have known chronic compression fractures. She is complaining of continued back pain but she is not having midline tenderness in this area. It is actually in her lumbar paraspinal musculature. I suspect this is a lumbar strain. No neurologic deficit or sciatica of this at this time. She is already prescribed oxycodone and Robaxin which she tolerates appropriately. We will augment her symptom control with lidocaine patches. Chest x-ray was concerning for possible pneumothorax versus artifact. CT obtained to further evaluate this. This did not show evidence of pneumothorax but did demonstrate a comminuted right clavicular head fracture. Sling and orthopedics follow-up will be ordered. Knee does not show evidence of acute periprosthetic fracture but she does have some findings suggesting of hardware failure. Her knee replacement was several years ago  Follow-up with orthopedics advised. The patient is ambulating in the emergency department and plans to stay with a family member so she does not have to go up steps for the next couple of days. She was notified of incidental finding of breast nodule and follow-up with PCP recommended. The patient was prescribed lidocaine patch. The patient was advised to follow-up with PCP, orthopedics. Discharge instructions including strict return precautions were given to the patient. All questions were addressed. The patient verbalizes understanding and is in agreement with the plan. - Consults:   None       Is this patient to be included in the SEP-1 Core Measure? No Exclusion criteria - the patient is NOT to be included for SEP-1 Core Measure due to: Infection is not suspected    Shayy REED MD, am the primary clinician of record.      During the

## 2023-10-30 ENCOUNTER — TELEPHONE (OUTPATIENT)
Dept: ORTHOPEDIC SURGERY | Age: 78
End: 2023-10-30

## 2023-10-30 NOTE — TELEPHONE ENCOUNTER
Patient referred to Dr. Ivana Ramirez from Emergency Department. Called patient in regards to scheduling an appointment to follow up with orthopedics. No answer. Left message for return call to schedule. [phone]. Ok to over book on the hour or half hour at [facility] on Wednesday.

## 2023-11-06 ENCOUNTER — TELEPHONE (OUTPATIENT)
Dept: ORTHOPEDIC SURGERY | Age: 78
End: 2023-11-06

## 2023-11-06 NOTE — TELEPHONE ENCOUNTER
Appointment Request     Patient requesting earlier appointment: Yes  Appointment offered to patient: 11/08/2023   Patient Contact Number: [phone]      PATIENT IS REQUESTING TO BE WORKED IN TOMORROW OR THURSDAY AFTERNOON

## 2023-11-06 NOTE — TELEPHONE ENCOUNTER
Spoke with patient. Scheduled her for tomorrow at . Patient needed to confirm time with her ride.   IF patient calls back to change appointment time, schedule her in an open slot at  on 11/7/23 after 1:45 PM.

## 2023-11-07 ENCOUNTER — OFFICE VISIT (OUTPATIENT)
Dept: ORTHOPEDIC SURGERY | Age: 78
End: 2023-11-07

## 2023-11-07 VITALS — BODY MASS INDEX: 34.22 KG/M2 | HEIGHT: 58 IN | WEIGHT: 163 LBS

## 2023-11-07 DIAGNOSIS — S42.017A CLOSED NONDISPLACED FRACTURE OF STERNAL END OF RIGHT CLAVICLE, INITIAL ENCOUNTER: Primary | ICD-10-CM

## 2023-11-07 NOTE — PROGRESS NOTES
CHIEF COMPLAINT: Right shoulder pain/ minimally displaced sternal end clavicle fracture. DATE OF INJURY: 10/28/2023, DOT: 2023    HISTORY:  Ms. Rita Ojeda is a 66 y.o.  female right handed who presents today for evaluation of a right shoulder injury. The patient reports that this injury occurred when she fell tripping over her slipper at home in the middle of the night. She was first seen and evaluated in [facility] ER, when she was x-rayed and placed in a sling, and asked to f/u with Orthopedics. The patient denies any other injuries. Movement makes the pain worse, the sling and resting makes the pain better. She rates her pain a 6/10 VAS. She was placed in a sling but states it makes it hurt worse. No numbness or tingling sensation. She has a history of a right shoulder replacement by Dr. Higinio Zhang in . Denies smoking. She is known to me for a right distal radius fracture in 2018.     Past Medical History:   Diagnosis Date    Arthritis     Cancer [facility])     breast    Cataract     Hypertension     Hypoglycemia     Personal history of kidney stones     Tremor        Past Surgical History:   Procedure Laterality Date    APPENDECTOMY      BREAST SURGERY  2004    lumpectomy right     SECTION      CHOLECYSTECTOMY      COLONOSCOPY      COLONOSCOPY N/A 2020    COLORECTAL CANCER SCREENING, NOT HIGH RISK performed by Parvin Ahmadi MD at [address] [facility].    JOINT REPLACEMENT      left knee    JOINT REPLACEMENT      right knee    KIDNEY STONE SURGERY      percutaneous    KNEE ARTHROPLASTY  [id number]    RIGHT TOTAL KNEE ARTHROPLASTY      LITHOTRIPSY      ROTATOR CUFF REPAIR  ,     bilateral    SHOULDER ARTHROPLASTY Right  15    With rotator cuff repair    TONSILLECTOMY      WRIST FRACTURE SURGERY Right 2018     RIGHT DISTAL RADIUS OPEN REDUCTION INTERNAL FIXATION -[employer]       Social History     Socioeconomic History    Marital

## 2024-02-21 ENCOUNTER — OFFICE VISIT (OUTPATIENT)
Dept: ORTHOPEDIC SURGERY | Age: 79
End: 2024-02-21
Payer: MEDICARE

## 2024-02-21 VITALS — HEIGHT: 58 IN | BODY MASS INDEX: 34.22 KG/M2 | WEIGHT: 163 LBS

## 2024-02-21 DIAGNOSIS — S42.017A CLOSED NONDISPLACED FRACTURE OF STERNAL END OF RIGHT CLAVICLE, INITIAL ENCOUNTER: Primary | ICD-10-CM

## 2024-02-21 PROCEDURE — G8427 DOCREV CUR MEDS BY ELIG CLIN: HCPCS | Performed by: ORTHOPAEDIC SURGERY

## 2024-02-21 PROCEDURE — G8400 PT W/DXA NO RESULTS DOC: HCPCS | Performed by: ORTHOPAEDIC SURGERY

## 2024-02-21 PROCEDURE — 1036F TOBACCO NON-USER: CPT | Performed by: ORTHOPAEDIC SURGERY

## 2024-02-21 PROCEDURE — G8484 FLU IMMUNIZE NO ADMIN: HCPCS | Performed by: ORTHOPAEDIC SURGERY

## 2024-02-21 PROCEDURE — 99213 OFFICE O/P EST LOW 20 MIN: CPT | Performed by: ORTHOPAEDIC SURGERY

## 2024-02-21 PROCEDURE — 1123F ACP DISCUSS/DSCN MKR DOCD: CPT | Performed by: ORTHOPAEDIC SURGERY

## 2024-02-21 PROCEDURE — 1090F PRES/ABSN URINE INCON ASSESS: CPT | Performed by: ORTHOPAEDIC SURGERY

## 2024-02-21 PROCEDURE — G8417 CALC BMI ABV UP PARAM F/U: HCPCS | Performed by: ORTHOPAEDIC SURGERY

## 2024-02-21 NOTE — PROGRESS NOTES
CHIEF COMPLAINT: Right shoulder pain/ minimally displaced sternal end clavicle fracture.    DATE OF INJURY: 10/28/2023, DOT: 2023    HISTORY:  Ms. Fisher is a 79 y.o.  female right handed who presents today for evaluation of a right shoulder injury.  The patient reports that this injury occurred when she fell tripping over her slipper at home in the middle of the night.  She was first seen and evaluated in [facility], when she was x-rayed and placed in a sling, and asked to f/u with Orthopedics. The patient denies any other injuries. She rates her pain a 4/10 VAS.  Pain is worse with laying on the right side or pushing over the area, but otherwise no pain.  She is working with home health PT.  No numbness or tingling sensation.  She has a history of a right shoulder replacement by Dr. King in .  Denies smoking.  She is known to me for a right distal radius fracture in 2018.    Past Medical History:   Diagnosis Date    Arthritis     Cancer (HCC)     breast    Cataract     Hypertension     Hypoglycemia     Personal history of kidney stones     Tremor        Past Surgical History:   Procedure Laterality Date    APPENDECTOMY      BREAST SURGERY  2004    lumpectomy right     SECTION      CHOLECYSTECTOMY      COLONOSCOPY      COLONOSCOPY N/A 2020    COLORECTAL CANCER SCREENING, NOT HIGH RISK performed by Favio Mcclelland MD at [facility] ENDOSCOPY    GASTRIC BYPASS SURGERY      JOINT REPLACEMENT      left knee    JOINT REPLACEMENT      right knee    KIDNEY STONE SURGERY      percutaneous    KNEE ARTHROPLASTY  [id number]    RIGHT TOTAL KNEE ARTHROPLASTY      LITHOTRIPSY      ROTATOR CUFF REPAIR  ,     bilateral    SHOULDER ARTHROPLASTY Right  15    With rotator cuff repair    TONSILLECTOMY      WRIST FRACTURE SURGERY Right 2018     RIGHT DISTAL RADIUS OPEN REDUCTION INTERNAL FIXATION -[employer]       Social History     Socioeconomic History    Marital status:

## 2024-06-27 ENCOUNTER — HOSPITAL ENCOUNTER (OUTPATIENT)
Dept: ULTRASOUND IMAGING | Age: 79
Discharge: HOME | End: 2024-06-27
Payer: MEDICARE

## 2024-06-27 DIAGNOSIS — M54.9 DORSALGIA, UNSPECIFIED: ICD-10-CM

## 2024-06-27 PROCEDURE — 76770 US EXAM ABDO BACK WALL COMP: CPT
